# Patient Record
Sex: FEMALE | Race: WHITE | NOT HISPANIC OR LATINO | Employment: UNEMPLOYED | URBAN - NONMETROPOLITAN AREA
[De-identification: names, ages, dates, MRNs, and addresses within clinical notes are randomized per-mention and may not be internally consistent; named-entity substitution may affect disease eponyms.]

---

## 2017-01-01 ENCOUNTER — APPOINTMENT (OUTPATIENT)
Dept: GENERAL RADIOLOGY | Facility: HOSPITAL | Age: 42
End: 2017-01-01

## 2017-01-01 ENCOUNTER — APPOINTMENT (OUTPATIENT)
Dept: CT IMAGING | Facility: HOSPITAL | Age: 42
End: 2017-01-01

## 2017-01-01 ENCOUNTER — HOSPITAL ENCOUNTER (EMERGENCY)
Facility: HOSPITAL | Age: 42
Discharge: HOME | End: 2017-06-12
Attending: EMERGENCY MEDICINE | Admitting: EMERGENCY MEDICINE

## 2017-01-01 ENCOUNTER — APPOINTMENT (OUTPATIENT)
Dept: INTERVENTIONAL RADIOLOGY/VASCULAR | Facility: HOSPITAL | Age: 42
End: 2017-01-01

## 2017-01-01 ENCOUNTER — HOSPITAL ENCOUNTER (EMERGENCY)
Facility: HOSPITAL | Age: 42
Discharge: HOME | End: 2017-06-20
Attending: FAMILY MEDICINE | Admitting: FAMILY MEDICINE

## 2017-01-01 ENCOUNTER — APPOINTMENT (OUTPATIENT)
Dept: ULTRASOUND IMAGING | Facility: HOSPITAL | Age: 42
End: 2017-01-01

## 2017-01-01 ENCOUNTER — HOSPITAL ENCOUNTER (EMERGENCY)
Facility: HOSPITAL | Age: 42
Discharge: LEFT BEFORE DISCHARGE | End: 2017-03-26

## 2017-01-01 ENCOUNTER — ANESTHESIA EVENT (OUTPATIENT)
Dept: EMERGENCY DEPT | Facility: HOSPITAL | Age: 42
End: 2017-01-01

## 2017-01-01 ENCOUNTER — HOSPITAL ENCOUNTER (INPATIENT)
Facility: HOSPITAL | Age: 42
LOS: 4 days | End: 2017-07-10
Attending: EMERGENCY MEDICINE | Admitting: HOSPITALIST

## 2017-01-01 ENCOUNTER — ANESTHESIA (OUTPATIENT)
Dept: EMERGENCY DEPT | Facility: HOSPITAL | Age: 42
End: 2017-01-01

## 2017-01-01 ENCOUNTER — APPOINTMENT (OUTPATIENT)
Dept: CARDIOLOGY | Facility: HOSPITAL | Age: 42
End: 2017-01-01
Attending: INTERNAL MEDICINE

## 2017-01-01 ENCOUNTER — HOSPITAL ENCOUNTER (EMERGENCY)
Facility: HOSPITAL | Age: 42
Discharge: HOME | End: 2017-02-22
Attending: EMERGENCY MEDICINE | Admitting: EMERGENCY MEDICINE

## 2017-01-01 VITALS
TEMPERATURE: 98.1 F | HEART RATE: 85 BPM | HEIGHT: 62 IN | RESPIRATION RATE: 18 BRPM | SYSTOLIC BLOOD PRESSURE: 129 MMHG | WEIGHT: 293 LBS | OXYGEN SATURATION: 94 % | BODY MASS INDEX: 53.92 KG/M2 | DIASTOLIC BLOOD PRESSURE: 84 MMHG

## 2017-01-01 VITALS
BODY MASS INDEX: 53.92 KG/M2 | RESPIRATION RATE: 18 BRPM | HEIGHT: 62 IN | DIASTOLIC BLOOD PRESSURE: 67 MMHG | HEART RATE: 71 BPM | WEIGHT: 293 LBS | SYSTOLIC BLOOD PRESSURE: 125 MMHG | OXYGEN SATURATION: 96 % | TEMPERATURE: 98.2 F

## 2017-01-01 VITALS
WEIGHT: 293 LBS | OXYGEN SATURATION: 91 % | RESPIRATION RATE: 26 BRPM | TEMPERATURE: 100.5 F | DIASTOLIC BLOOD PRESSURE: 44 MMHG | SYSTOLIC BLOOD PRESSURE: 96 MMHG | HEIGHT: 62 IN | BODY MASS INDEX: 53.92 KG/M2

## 2017-01-01 VITALS
SYSTOLIC BLOOD PRESSURE: 146 MMHG | HEIGHT: 62 IN | DIASTOLIC BLOOD PRESSURE: 73 MMHG | OXYGEN SATURATION: 94 % | RESPIRATION RATE: 18 BRPM | BODY MASS INDEX: 53.92 KG/M2 | WEIGHT: 293 LBS | HEART RATE: 77 BPM

## 2017-01-01 VITALS
OXYGEN SATURATION: 94 % | HEART RATE: 64 BPM | TEMPERATURE: 97.9 F | HEIGHT: 62 IN | DIASTOLIC BLOOD PRESSURE: 78 MMHG | BODY MASS INDEX: 53.92 KG/M2 | WEIGHT: 293 LBS | RESPIRATION RATE: 18 BRPM | SYSTOLIC BLOOD PRESSURE: 153 MMHG

## 2017-01-01 DIAGNOSIS — N17.9 ACUTE RENAL FAILURE, UNSPECIFIED ACUTE RENAL FAILURE TYPE (HCC): ICD-10-CM

## 2017-01-01 DIAGNOSIS — S46.911A STRAIN OF RIGHT SHOULDER, INITIAL ENCOUNTER: ICD-10-CM

## 2017-01-01 DIAGNOSIS — R79.89 ELEVATED LFTS: ICD-10-CM

## 2017-01-01 DIAGNOSIS — J18.9 SEPSIS DUE TO PNEUMONIA (HCC): ICD-10-CM

## 2017-01-01 DIAGNOSIS — K43.9 VENTRAL HERNIA WITHOUT OBSTRUCTION OR GANGRENE: ICD-10-CM
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
## 2017-01-01 DIAGNOSIS — R16.0 HEPATOMEGALY: ICD-10-CM

## 2017-01-01 DIAGNOSIS — A41.9 SEPSIS DUE TO PNEUMONIA (HCC): ICD-10-CM

## 2017-01-01 DIAGNOSIS — E11.8 UNCONTROLLED TYPE 2 DIABETES MELLITUS WITH COMPLICATION, UNSPECIFIED LONG TERM INSULIN USE STATUS: ICD-10-CM

## 2017-01-01 DIAGNOSIS — N39.0 ACUTE UTI: ICD-10-CM

## 2017-01-01 DIAGNOSIS — E11.65 TYPE 2 DIABETES MELLITUS WITH HYPERGLYCEMIA, UNSPECIFIED LONG TERM INSULIN USE STATUS: ICD-10-CM

## 2017-01-01 DIAGNOSIS — J96.02 ACUTE RESPIRATORY FAILURE WITH HYPOXIA AND HYPERCAPNIA (HCC): Primary | ICD-10-CM

## 2017-01-01 DIAGNOSIS — R73.9 HYPERGLYCEMIA: ICD-10-CM

## 2017-01-01 DIAGNOSIS — R56.9 SEIZURE (HCC): Primary | ICD-10-CM

## 2017-01-01 DIAGNOSIS — S50.01XA CONTUSION OF RIGHT ELBOW, INITIAL ENCOUNTER: ICD-10-CM

## 2017-01-01 DIAGNOSIS — J96.01 ACUTE RESPIRATORY FAILURE WITH HYPOXIA AND HYPERCAPNIA (HCC): Primary | ICD-10-CM

## 2017-01-01 DIAGNOSIS — E11.65 UNCONTROLLED TYPE 2 DIABETES MELLITUS WITH COMPLICATION, UNSPECIFIED LONG TERM INSULIN USE STATUS: ICD-10-CM

## 2017-01-01 DIAGNOSIS — S16.1XXA STRAIN, CERVICAL, INITIAL ENCOUNTER: ICD-10-CM

## 2017-01-01 DIAGNOSIS — S29.012A STRAIN OF THORACIC PARASPINAL MUSCLES EXCLUDING T1 AND T2 LEVELS, INITIAL ENCOUNTER: ICD-10-CM

## 2017-01-01 DIAGNOSIS — R73.9 HYPERGLYCEMIA: Primary | ICD-10-CM

## 2017-01-01 DIAGNOSIS — N30.90 CYSTITIS: ICD-10-CM

## 2017-01-01 LAB
ACETONE BLD QL: NEGATIVE
ACETONE BLD QL: NEGATIVE
ALBUMIN SERPL-MCNC: 3.1 G/DL (ref 3.4–4.8)
ALBUMIN SERPL-MCNC: 3.4 G/DL (ref 3.4–4.8)
ALBUMIN SERPL-MCNC: 3.9 G/DL (ref 3.4–4.8)
ALBUMIN SERPL-MCNC: 4.2 G/DL (ref 3.4–4.8)
ALBUMIN/GLOB SERPL: 0.7 G/DL (ref 1.1–1.8)
ALBUMIN/GLOB SERPL: 0.7 G/DL (ref 1.1–1.8)
ALBUMIN/GLOB SERPL: 0.8 G/DL (ref 1.1–1.8)
ALBUMIN/GLOB SERPL: 0.8 G/DL (ref 1.1–1.8)
ALBUMIN/GLOB SERPL: 1 G/DL (ref 1.1–1.8)
ALBUMIN/GLOB SERPL: 1 G/DL (ref 1.1–1.8)
ALP SERPL-CCNC: 169 U/L (ref 38–126)
ALP SERPL-CCNC: 189 U/L (ref 38–126)
ALP SERPL-CCNC: 229 U/L (ref 38–126)
ALP SERPL-CCNC: 265 U/L (ref 38–126)
ALP SERPL-CCNC: 307 U/L (ref 38–126)
ALP SERPL-CCNC: 470 U/L (ref 38–126)
ALT SERPL W P-5'-P-CCNC: 110 U/L (ref 9–52)
ALT SERPL W P-5'-P-CCNC: 123 U/L (ref 9–52)
ALT SERPL W P-5'-P-CCNC: 53 U/L (ref 9–52)
ALT SERPL W P-5'-P-CCNC: 58 U/L (ref 9–52)
ALT SERPL W P-5'-P-CCNC: 90 U/L (ref 9–52)
ALT SERPL W P-5'-P-CCNC: 97 U/L (ref 9–52)
ANION GAP SERPL CALCULATED.3IONS-SCNC: 14 MMOL/L (ref 5–15)
ANION GAP SERPL CALCULATED.3IONS-SCNC: 15 MMOL/L (ref 5–15)
ANION GAP SERPL CALCULATED.3IONS-SCNC: 16 MMOL/L (ref 5–15)
ANION GAP SERPL CALCULATED.3IONS-SCNC: 17 MMOL/L (ref 5–15)
ANION GAP SERPL CALCULATED.3IONS-SCNC: 17 MMOL/L (ref 5–15)
ANION GAP SERPL CALCULATED.3IONS-SCNC: 18 MMOL/L (ref 5–15)
ANION GAP SERPL CALCULATED.3IONS-SCNC: 19 MMOL/L (ref 5–15)
ANION GAP SERPL CALCULATED.3IONS-SCNC: 23 MMOL/L (ref 5–15)
ANISOCYTOSIS BLD QL: ABNORMAL
ARTERIAL PATENCY WRIST A: ABNORMAL
AST SERPL-CCNC: 136 U/L (ref 14–36)
AST SERPL-CCNC: 213 U/L (ref 14–36)
AST SERPL-CCNC: 268 U/L (ref 14–36)
AST SERPL-CCNC: 387 U/L (ref 14–36)
AST SERPL-CCNC: 60 U/L (ref 14–36)
AST SERPL-CCNC: 76 U/L (ref 14–36)
ATMOSPHERIC PRESS: ABNORMAL MMHG
BACTERIA SPEC AEROBE CULT: NORMAL
BACTERIA SPEC RESP CULT: NORMAL
BACTERIA SPEC RESP CULT: NORMAL
BACTERIA UR QL AUTO: ABNORMAL /HPF
BASE EXCESS BLDA CALC-SCNC: -10.4 MMOL/L (ref -2.4–2.4)
BASE EXCESS BLDA CALC-SCNC: -11.3 MMOL/L (ref -2.4–2.4)
BASE EXCESS BLDA CALC-SCNC: -12.1 MMOL/L (ref -2.4–2.4)
BASE EXCESS BLDA CALC-SCNC: -12.6 MMOL/L (ref -2.4–2.4)
BASE EXCESS BLDA CALC-SCNC: -3.7 MMOL/L (ref -2.4–2.4)
BASE EXCESS BLDA CALC-SCNC: -5.3 MMOL/L (ref -2.4–2.4)
BASE EXCESS BLDA CALC-SCNC: -5.9 MMOL/L (ref -2.4–2.4)
BASE EXCESS BLDA CALC-SCNC: -6.1 MMOL/L (ref -2.4–2.4)
BASE EXCESS BLDA CALC-SCNC: -8.5 MMOL/L (ref -2.4–2.4)
BASE EXCESS BLDA CALC-SCNC: 0.3 MMOL/L (ref -2.4–2.4)
BASE EXCESS BLDA CALC-SCNC: 4.1 MMOL/L (ref -2.4–2.4)
BASE EXCESS BLDA CALC-SCNC: 4.7 MMOL/L (ref -2.4–2.4)
BASOPHILS # BLD AUTO: 0.02 10*3/MM3 (ref 0–0.2)
BASOPHILS # BLD AUTO: 0.02 10*3/MM3 (ref 0–0.2)
BASOPHILS # BLD AUTO: 0.03 10*3/MM3 (ref 0–0.2)
BASOPHILS # BLD AUTO: 0.04 10*3/MM3 (ref 0–0.2)
BASOPHILS # BLD AUTO: 0.06 10*3/MM3 (ref 0–0.2)
BASOPHILS NFR BLD AUTO: 0.2 % (ref 0–2)
BASOPHILS NFR BLD AUTO: 0.2 % (ref 0–2)
BASOPHILS NFR BLD AUTO: 0.5 % (ref 0–2)
BASOPHILS NFR BLD AUTO: 0.6 % (ref 0–2)
BASOPHILS NFR BLD AUTO: 0.8 % (ref 0–2)
BDY SITE: ABNORMAL
BH CV ECHO MEAS - AO MAX PG (FULL): 0.98 MMHG
BH CV ECHO MEAS - AO MAX PG: 5.4 MMHG
BH CV ECHO MEAS - AO MEAN PG (FULL): 0.31 MMHG
BH CV ECHO MEAS - AO MEAN PG: 3.5 MMHG
BH CV ECHO MEAS - AO ROOT AREA: 6.6 CM^2
BH CV ECHO MEAS - AO ROOT DIAM: 2.9 CM
BH CV ECHO MEAS - AO V2 MAX: 115.8 CM/SEC
BH CV ECHO MEAS - AO V2 MEAN: 89.6 CM/SEC
BH CV ECHO MEAS - AO V2 VTI: 19.9 CM
BH CV ECHO MEAS - AVA(I,A): 2.2 CM^2
BH CV ECHO MEAS - AVA(I,D): 2.2 CM^2
BH CV ECHO MEAS - AVA(V,A): 2.3 CM^2
BH CV ECHO MEAS - AVA(V,D): 2.3 CM^2
BH CV ECHO MEAS - EDV(CUBED): 105 ML
BH CV ECHO MEAS - EDV(TEICH): 103.2 ML
BH CV ECHO MEAS - EF(CUBED): 63.1 %
BH CV ECHO MEAS - EF(TEICH): 54.6 %
BH CV ECHO MEAS - ESV(CUBED): 38.7 ML
BH CV ECHO MEAS - ESV(TEICH): 46.8 ML
BH CV ECHO MEAS - FS: 28.3 %
BH CV ECHO MEAS - IVS/LVPW: 0.99
BH CV ECHO MEAS - IVSD: 0.98 CM
BH CV ECHO MEAS - LA DIMENSION: 3 CM
BH CV ECHO MEAS - LA/AO: 1
BH CV ECHO MEAS - LV MASS(C)D: 163.1 GRAMS
BH CV ECHO MEAS - LV MAX PG: 4.4 MMHG
BH CV ECHO MEAS - LV MEAN PG: 3.2 MMHG
BH CV ECHO MEAS - LV V1 MAX: 104.8 CM/SEC
BH CV ECHO MEAS - LV V1 MEAN: 86.5 CM/SEC
BH CV ECHO MEAS - LV V1 VTI: 17.4 CM
BH CV ECHO MEAS - LVIDD: 4.7 CM
BH CV ECHO MEAS - LVIDS: 3.4 CM
BH CV ECHO MEAS - LVOT AREA (M): 2.5 CM^2
BH CV ECHO MEAS - LVOT AREA: 2.6 CM^2
BH CV ECHO MEAS - LVOT DIAM: 1.8 CM
BH CV ECHO MEAS - LVPWD: 1 CM
BH CV ECHO MEAS - MV A MAX VEL: 77.1 CM/SEC
BH CV ECHO MEAS - MV DEC SLOPE: 462.6 CM/SEC^2
BH CV ECHO MEAS - MV E MAX VEL: 63.4 CM/SEC
BH CV ECHO MEAS - MV E/A: 0.82
BH CV ECHO MEAS - MV MAX PG: 4.3 MMHG
BH CV ECHO MEAS - MV MEAN PG: 1 MMHG
BH CV ECHO MEAS - MV P1/2T MAX VEL: 64.6 CM/SEC
BH CV ECHO MEAS - MV P1/2T: 40.9 MSEC
BH CV ECHO MEAS - MV V2 MAX: 103.2 CM/SEC
BH CV ECHO MEAS - MV V2 MEAN: 42 CM/SEC
BH CV ECHO MEAS - MV V2 VTI: 12.4 CM
BH CV ECHO MEAS - MVA P1/2T LCG: 3.4 CM^2
BH CV ECHO MEAS - MVA(P1/2T): 5.4 CM^2
BH CV ECHO MEAS - MVA(VTI): 3.6 CM^2
BH CV ECHO MEAS - PA MAX PG: 2.8 MMHG
BH CV ECHO MEAS - PA V2 MAX: 84.2 CM/SEC
BH CV ECHO MEAS - RVDD: 2.5 CM
BH CV ECHO MEAS - SV(AO): 131.6 ML
BH CV ECHO MEAS - SV(CUBED): 66.3 ML
BH CV ECHO MEAS - SV(LVOT): 44.3 ML
BH CV ECHO MEAS - SV(TEICH): 56.4 ML
BILIRUB SERPL-MCNC: 0.6 MG/DL (ref 0.2–1.3)
BILIRUB SERPL-MCNC: 0.6 MG/DL (ref 0.2–1.3)
BILIRUB SERPL-MCNC: 2.4 MG/DL (ref 0.2–1.3)
BILIRUB SERPL-MCNC: 3.6 MG/DL (ref 0.2–1.3)
BILIRUB SERPL-MCNC: 5.2 MG/DL (ref 0.2–1.3)
BILIRUB SERPL-MCNC: 6.1 MG/DL (ref 0.2–1.3)
BILIRUB UR QL STRIP: ABNORMAL
BILIRUB UR QL STRIP: NEGATIVE
BILIRUB UR QL STRIP: NEGATIVE
BUN BLD-MCNC: 13 MG/DL (ref 7–21)
BUN BLD-MCNC: 14 MG/DL (ref 7–21)
BUN BLD-MCNC: 30 MG/DL (ref 7–21)
BUN BLD-MCNC: 33 MG/DL (ref 7–21)
BUN BLD-MCNC: 37 MG/DL (ref 7–21)
BUN BLD-MCNC: 41 MG/DL (ref 7–21)
BUN BLD-MCNC: 55 MG/DL (ref 7–21)
BUN BLD-MCNC: 56 MG/DL (ref 7–21)
BUN/CREAT SERPL: 10.1 (ref 7–25)
BUN/CREAT SERPL: 12.2 (ref 7–25)
BUN/CREAT SERPL: 16.9 (ref 7–25)
BUN/CREAT SERPL: 17.3 (ref 7–25)
BUN/CREAT SERPL: 17.6 (ref 7–25)
BUN/CREAT SERPL: 17.9 (ref 7–25)
BUN/CREAT SERPL: 7.9 (ref 7–25)
BUN/CREAT SERPL: 8.3 (ref 7–25)
CA-I BLD-MCNC: 3.8 MG/DL (ref 4.5–4.9)
CA-I BLD-MCNC: 3.8 MG/DL (ref 4.5–4.9)
CA-I BLD-MCNC: 4 MG/DL (ref 4.5–4.9)
CA-I BLD-MCNC: 4.2 MG/DL (ref 4.5–4.9)
CA-I BLD-MCNC: 4.4 MG/DL (ref 4.5–4.9)
CA-I BLD-MCNC: 4.5 MG/DL (ref 4.5–4.9)
CA-I BLD-MCNC: 4.6 MG/DL (ref 4.5–4.9)
CA-I BLD-MCNC: 4.7 MG/DL (ref 4.5–4.9)
CALCIUM SPEC-SCNC: 10.2 MG/DL (ref 8.4–10.2)
CALCIUM SPEC-SCNC: 6.8 MG/DL (ref 8.4–10.2)
CALCIUM SPEC-SCNC: 7.5 MG/DL (ref 8.4–10.2)
CALCIUM SPEC-SCNC: 7.6 MG/DL (ref 8.4–10.2)
CALCIUM SPEC-SCNC: 8.1 MG/DL (ref 8.4–10.2)
CALCIUM SPEC-SCNC: 8.2 MG/DL (ref 8.4–10.2)
CALCIUM SPEC-SCNC: 9 MG/DL (ref 8.4–10.2)
CALCIUM SPEC-SCNC: 9.9 MG/DL (ref 8.4–10.2)
CHLORIDE SERPL-SCNC: 86 MMOL/L (ref 95–110)
CHLORIDE SERPL-SCNC: 87 MMOL/L (ref 95–110)
CHLORIDE SERPL-SCNC: 93 MMOL/L (ref 95–110)
CHLORIDE SERPL-SCNC: 94 MMOL/L (ref 95–110)
CHLORIDE SERPL-SCNC: 94 MMOL/L (ref 95–110)
CHLORIDE SERPL-SCNC: 96 MMOL/L (ref 95–110)
CHLORIDE SERPL-SCNC: 98 MMOL/L (ref 95–110)
CHLORIDE SERPL-SCNC: 98 MMOL/L (ref 95–110)
CLARITY UR: ABNORMAL
CO2 BLDA-SCNC: 21.8 MMOL/L (ref 23–27)
CO2 BLDA-SCNC: 23.4 MMOL/L (ref 23–27)
CO2 BLDA-SCNC: 23.8 MMOL/L (ref 23–27)
CO2 BLDA-SCNC: 25 MMOL/L (ref 23–27)
CO2 BLDA-SCNC: 25 MMOL/L (ref 23–27)
CO2 BLDA-SCNC: 25.9 MMOL/L (ref 23–27)
CO2 BLDA-SCNC: 26.6 MMOL/L (ref 23–27)
CO2 BLDA-SCNC: 26.9 MMOL/L (ref 23–27)
CO2 BLDA-SCNC: 28.4 MMOL/L (ref 23–27)
CO2 BLDA-SCNC: 30.9 MMOL/L (ref 23–27)
CO2 BLDA-SCNC: 31.8 MMOL/L (ref 23–27)
CO2 BLDA-SCNC: 40.6 MMOL/L (ref 23–27)
CO2 SERPL-SCNC: 19 MMOL/L (ref 22–31)
CO2 SERPL-SCNC: 20 MMOL/L (ref 22–31)
CO2 SERPL-SCNC: 20 MMOL/L (ref 22–31)
CO2 SERPL-SCNC: 21 MMOL/L (ref 22–31)
CO2 SERPL-SCNC: 24 MMOL/L (ref 22–31)
CO2 SERPL-SCNC: 25 MMOL/L (ref 22–31)
CO2 SERPL-SCNC: 25 MMOL/L (ref 22–31)
CO2 SERPL-SCNC: 33 MMOL/L (ref 22–31)
COLOR UR: ABNORMAL
COLOR UR: ABNORMAL
COLOR UR: YELLOW
CREAT BLD-MCNC: 0.74 MG/DL (ref 0.5–1)
CREAT BLD-MCNC: 0.81 MG/DL (ref 0.5–1)
CREAT BLD-MCNC: 1.78 MG/DL (ref 0.5–1)
CREAT BLD-MCNC: 1.84 MG/DL (ref 0.5–1)
CREAT BLD-MCNC: 3.36 MG/DL (ref 0.5–1)
CREAT BLD-MCNC: 4.68 MG/DL (ref 0.5–1)
CREAT BLD-MCNC: 5.46 MG/DL (ref 0.5–1)
CREAT BLD-MCNC: 6.74 MG/DL (ref 0.5–1)
CREAT UR-MCNC: 103.3 MG/DL
D-LACTATE SERPL-SCNC: 2.4 MMOL/L (ref 0.5–2)
D-LACTATE SERPL-SCNC: 3 MMOL/L (ref 0.5–2)
DEPRECATED RDW RBC AUTO: 45.6 FL (ref 36.4–46.3)
DEPRECATED RDW RBC AUTO: 46.6 FL (ref 36.4–46.3)
DEPRECATED RDW RBC AUTO: 47 FL (ref 36.4–46.3)
DEPRECATED RDW RBC AUTO: 48.6 FL (ref 36.4–46.3)
DEPRECATED RDW RBC AUTO: 49.1 FL (ref 36.4–46.3)
DEPRECATED RDW RBC AUTO: 49.3 FL (ref 36.4–46.3)
DEPRECATED RDW RBC AUTO: 56.3 FL (ref 36.4–46.3)
EOSINOPHIL # BLD AUTO: 0 10*3/MM3 (ref 0–0.7)
EOSINOPHIL # BLD AUTO: 0 10*3/MM3 (ref 0–0.7)
EOSINOPHIL # BLD AUTO: 0.01 10*3/MM3 (ref 0–0.7)
EOSINOPHIL # BLD AUTO: 0.21 10*3/MM3 (ref 0–0.7)
EOSINOPHIL # BLD AUTO: 0.26 10*3/MM3 (ref 0–0.7)
EOSINOPHIL NFR BLD AUTO: 0 % (ref 0–7)
EOSINOPHIL NFR BLD AUTO: 0 % (ref 0–7)
EOSINOPHIL NFR BLD AUTO: 0.2 % (ref 0–7)
EOSINOPHIL NFR BLD AUTO: 3.1 % (ref 0–7)
EOSINOPHIL NFR BLD AUTO: 3.6 % (ref 0–7)
ERYTHROCYTE [DISTWIDTH] IN BLOOD BY AUTOMATED COUNT: 14.2 % (ref 11.5–14.5)
ERYTHROCYTE [DISTWIDTH] IN BLOOD BY AUTOMATED COUNT: 14.2 % (ref 11.5–14.5)
ERYTHROCYTE [DISTWIDTH] IN BLOOD BY AUTOMATED COUNT: 14.4 % (ref 11.5–14.5)
ERYTHROCYTE [DISTWIDTH] IN BLOOD BY AUTOMATED COUNT: 14.7 % (ref 11.5–14.5)
ERYTHROCYTE [DISTWIDTH] IN BLOOD BY AUTOMATED COUNT: 14.9 % (ref 11.5–14.5)
ERYTHROCYTE [DISTWIDTH] IN BLOOD BY AUTOMATED COUNT: 15 % (ref 11.5–14.5)
ERYTHROCYTE [DISTWIDTH] IN BLOOD BY AUTOMATED COUNT: 16.8 % (ref 11.5–14.5)
GFR SERPL CREATININE-BSD FRML MDRD: 10 ML/MIN/1.73 (ref 60–135)
GFR SERPL CREATININE-BSD FRML MDRD: 15 ML/MIN/1.73
GFR SERPL CREATININE-BSD FRML MDRD: 30 ML/MIN/1.73 (ref 58–135)
GFR SERPL CREATININE-BSD FRML MDRD: 31 ML/MIN/1.73 (ref 58–135)
GFR SERPL CREATININE-BSD FRML MDRD: 7 ML/MIN/1.73 (ref 58–135)
GFR SERPL CREATININE-BSD FRML MDRD: 78 ML/MIN/1.73 (ref 58–135)
GFR SERPL CREATININE-BSD FRML MDRD: 87 ML/MIN/1.73 (ref 58–135)
GFR SERPL CREATININE-BSD FRML MDRD: 9 ML/MIN/1.73 (ref 58–135)
GLOBULIN UR ELPH-MCNC: 4.1 GM/DL (ref 2.3–3.5)
GLOBULIN UR ELPH-MCNC: 4.3 GM/DL (ref 2.3–3.5)
GLOBULIN UR ELPH-MCNC: 4.4 GM/DL (ref 2.3–3.5)
GLOBULIN UR ELPH-MCNC: 4.8 GM/DL (ref 2.3–3.5)
GLUCOSE BLD-MCNC: 124 MG/DL (ref 60–100)
GLUCOSE BLD-MCNC: 143 MG/DL (ref 60–100)
GLUCOSE BLD-MCNC: 185 MG/DL (ref 60–100)
GLUCOSE BLD-MCNC: 381 MG/DL (ref 60–100)
GLUCOSE BLD-MCNC: 454 MG/DL (ref 60–100)
GLUCOSE BLD-MCNC: 525 MG/DL (ref 60–100)
GLUCOSE BLD-MCNC: 546 MG/DL (ref 60–100)
GLUCOSE BLD-MCNC: 588 MG/DL (ref 60–100)
GLUCOSE BLD-MCNC: 623 MG/DL (ref 60–100)
GLUCOSE BLDA-MCNC: 107 MMOL/L
GLUCOSE BLDA-MCNC: 124 MMOL/L
GLUCOSE BLDA-MCNC: 132 MMOL/L
GLUCOSE BLDA-MCNC: 134 MMOL/L
GLUCOSE BLDA-MCNC: 145 MMOL/L
GLUCOSE BLDA-MCNC: 200 MMOL/L
GLUCOSE BLDA-MCNC: 410 MMOL/L
GLUCOSE BLDA-MCNC: 483 MMOL/L
GLUCOSE BLDA-MCNC: 567 MMOL/L
GLUCOSE BLDA-MCNC: 582 MMOL/L
GLUCOSE BLDA-MCNC: 583 MMOL/L
GLUCOSE BLDA-MCNC: 95 MMOL/L
GLUCOSE BLDC GLUCOMTR-MCNC: 104 MG/DL (ref 70–130)
GLUCOSE BLDC GLUCOMTR-MCNC: 104 MG/DL (ref 70–130)
GLUCOSE BLDC GLUCOMTR-MCNC: 106 MG/DL (ref 70–130)
GLUCOSE BLDC GLUCOMTR-MCNC: 106 MG/DL (ref 70–130)
GLUCOSE BLDC GLUCOMTR-MCNC: 107 MG/DL (ref 70–130)
GLUCOSE BLDC GLUCOMTR-MCNC: 110 MG/DL (ref 70–130)
GLUCOSE BLDC GLUCOMTR-MCNC: 111 MG/DL (ref 70–130)
GLUCOSE BLDC GLUCOMTR-MCNC: 112 MG/DL (ref 70–130)
GLUCOSE BLDC GLUCOMTR-MCNC: 113 MG/DL (ref 70–130)
GLUCOSE BLDC GLUCOMTR-MCNC: 114 MG/DL (ref 70–130)
GLUCOSE BLDC GLUCOMTR-MCNC: 115 MG/DL (ref 70–130)
GLUCOSE BLDC GLUCOMTR-MCNC: 117 MG/DL (ref 70–130)
GLUCOSE BLDC GLUCOMTR-MCNC: 118 MG/DL (ref 70–130)
GLUCOSE BLDC GLUCOMTR-MCNC: 119 MG/DL (ref 70–130)
GLUCOSE BLDC GLUCOMTR-MCNC: 119 MG/DL (ref 70–130)
GLUCOSE BLDC GLUCOMTR-MCNC: 120 MG/DL (ref 70–130)
GLUCOSE BLDC GLUCOMTR-MCNC: 120 MG/DL (ref 70–130)
GLUCOSE BLDC GLUCOMTR-MCNC: 121 MG/DL (ref 70–130)
GLUCOSE BLDC GLUCOMTR-MCNC: 121 MG/DL (ref 70–130)
GLUCOSE BLDC GLUCOMTR-MCNC: 125 MG/DL (ref 70–130)
GLUCOSE BLDC GLUCOMTR-MCNC: 125 MG/DL (ref 70–130)
GLUCOSE BLDC GLUCOMTR-MCNC: 127 MG/DL (ref 70–130)
GLUCOSE BLDC GLUCOMTR-MCNC: 128 MG/DL (ref 70–130)
GLUCOSE BLDC GLUCOMTR-MCNC: 128 MG/DL (ref 70–130)
GLUCOSE BLDC GLUCOMTR-MCNC: 129 MG/DL (ref 70–130)
GLUCOSE BLDC GLUCOMTR-MCNC: 130 MG/DL (ref 70–130)
GLUCOSE BLDC GLUCOMTR-MCNC: 132 MG/DL (ref 70–130)
GLUCOSE BLDC GLUCOMTR-MCNC: 134 MG/DL (ref 70–130)
GLUCOSE BLDC GLUCOMTR-MCNC: 144 MG/DL (ref 70–130)
GLUCOSE BLDC GLUCOMTR-MCNC: 144 MG/DL (ref 70–130)
GLUCOSE BLDC GLUCOMTR-MCNC: 145 MG/DL (ref 70–130)
GLUCOSE BLDC GLUCOMTR-MCNC: 146 MG/DL (ref 70–130)
GLUCOSE BLDC GLUCOMTR-MCNC: 147 MG/DL (ref 70–130)
GLUCOSE BLDC GLUCOMTR-MCNC: 176 MG/DL (ref 70–130)
GLUCOSE BLDC GLUCOMTR-MCNC: 185 MG/DL (ref 70–130)
GLUCOSE BLDC GLUCOMTR-MCNC: 189 MG/DL (ref 70–130)
GLUCOSE BLDC GLUCOMTR-MCNC: 196 MG/DL (ref 70–130)
GLUCOSE BLDC GLUCOMTR-MCNC: 220 MG/DL (ref 70–130)
GLUCOSE BLDC GLUCOMTR-MCNC: 227 MG/DL (ref 70–130)
GLUCOSE BLDC GLUCOMTR-MCNC: 232 MG/DL (ref 70–130)
GLUCOSE BLDC GLUCOMTR-MCNC: 237 MG/DL (ref 70–130)
GLUCOSE BLDC GLUCOMTR-MCNC: 259 MG/DL (ref 70–130)
GLUCOSE BLDC GLUCOMTR-MCNC: 283 MG/DL (ref 70–130)
GLUCOSE BLDC GLUCOMTR-MCNC: 289 MG/DL (ref 70–130)
GLUCOSE BLDC GLUCOMTR-MCNC: 329 MG/DL (ref 70–130)
GLUCOSE BLDC GLUCOMTR-MCNC: 341 MG/DL (ref 70–130)
GLUCOSE BLDC GLUCOMTR-MCNC: 354 MG/DL (ref 70–130)
GLUCOSE BLDC GLUCOMTR-MCNC: 359 MG/DL (ref 70–130)
GLUCOSE BLDC GLUCOMTR-MCNC: 377 MG/DL (ref 70–130)
GLUCOSE BLDC GLUCOMTR-MCNC: 425 MG/DL (ref 70–130)
GLUCOSE BLDC GLUCOMTR-MCNC: 443 MG/DL (ref 70–130)
GLUCOSE BLDC GLUCOMTR-MCNC: 506 MG/DL (ref 70–130)
GLUCOSE BLDC GLUCOMTR-MCNC: 521 MG/DL (ref 70–130)
GLUCOSE BLDC GLUCOMTR-MCNC: 521 MG/DL (ref 70–130)
GLUCOSE BLDC GLUCOMTR-MCNC: 543 MG/DL (ref 70–130)
GLUCOSE BLDC GLUCOMTR-MCNC: 543 MG/DL (ref 70–130)
GLUCOSE BLDC GLUCOMTR-MCNC: 571 MG/DL (ref 70–130)
GLUCOSE BLDC GLUCOMTR-MCNC: 79 MG/DL (ref 70–130)
GLUCOSE BLDC GLUCOMTR-MCNC: 80 MG/DL (ref 70–130)
GLUCOSE BLDC GLUCOMTR-MCNC: 88 MG/DL (ref 70–130)
GLUCOSE BLDC GLUCOMTR-MCNC: 94 MG/DL (ref 70–130)
GLUCOSE BLDC GLUCOMTR-MCNC: 98 MG/DL (ref 70–130)
GLUCOSE UR STRIP-MCNC: ABNORMAL MG/DL
GRAM STN SPEC: NORMAL
HCG SERPL QL: NEGATIVE
HCO3 BLDA-SCNC: 19.6 MMOL/L (ref 22–26)
HCO3 BLDA-SCNC: 21.7 MMOL/L (ref 22–26)
HCO3 BLDA-SCNC: 21.8 MMOL/L (ref 22–26)
HCO3 BLDA-SCNC: 22.1 MMOL/L (ref 22–26)
HCO3 BLDA-SCNC: 23.1 MMOL/L (ref 22–26)
HCO3 BLDA-SCNC: 23.2 MMOL/L (ref 22–26)
HCO3 BLDA-SCNC: 24.2 MMOL/L (ref 22–26)
HCO3 BLDA-SCNC: 24.8 MMOL/L (ref 22–26)
HCO3 BLDA-SCNC: 25.3 MMOL/L (ref 22–26)
HCO3 BLDA-SCNC: 27.7 MMOL/L (ref 22–26)
HCO3 BLDA-SCNC: 30.2 MMOL/L (ref 22–26)
HCO3 BLDA-SCNC: 37.2 MMOL/L (ref 22–26)
HCT VFR BLD AUTO: 34.5 % (ref 35–45)
HCT VFR BLD AUTO: 35.9 % (ref 35–45)
HCT VFR BLD AUTO: 36.1 % (ref 35–45)
HCT VFR BLD AUTO: 38.6 % (ref 35–45)
HCT VFR BLD AUTO: 38.7 % (ref 35–45)
HCT VFR BLD AUTO: 39.7 % (ref 35–45)
HCT VFR BLD AUTO: 43.3 % (ref 35–45)
HCT VFR BLD CALC: 36 % (ref 38–47)
HCT VFR BLD CALC: 37 % (ref 38–47)
HCT VFR BLD CALC: 38 % (ref 38–47)
HCT VFR BLD CALC: 39 %
HCT VFR BLD CALC: 40 % (ref 38–47)
HCT VFR BLD CALC: 41 % (ref 38–47)
HCT VFR BLD CALC: 42 % (ref 38–47)
HCT VFR BLD CALC: 43 %
HCT VFR BLD CALC: 43 % (ref 38–47)
HCT VFR BLD CALC: 44 % (ref 38–47)
HGB BLD-MCNC: 11.4 G/DL (ref 12–15.5)
HGB BLD-MCNC: 12.1 G/DL (ref 12–15.5)
HGB BLD-MCNC: 12.5 G/DL (ref 12–15.5)
HGB BLD-MCNC: 13.4 G/DL (ref 12–15.5)
HGB BLD-MCNC: 13.4 G/DL (ref 12–15.5)
HGB BLD-MCNC: 14 G/DL (ref 12–15.5)
HGB BLD-MCNC: 15.4 G/DL (ref 12–15.5)
HGB BLDA-MCNC: 12.2 G/DL (ref 12–16)
HGB BLDA-MCNC: 12.7 G/DL (ref 12–16)
HGB BLDA-MCNC: 13 G/DL (ref 12–16)
HGB BLDA-MCNC: 13.2 G/DL (ref 12–16)
HGB BLDA-MCNC: 13.7 G/DL (ref 12–16)
HGB BLDA-MCNC: 13.9 G/DL (ref 12–16)
HGB BLDA-MCNC: 14.2 G/DL (ref 12–16)
HGB BLDA-MCNC: 14.3 G/DL (ref 12–16)
HGB BLDA-MCNC: 14.4 G/DL (ref 12–16)
HGB BLDA-MCNC: 14.5 G/DL (ref 12–16)
HGB BLDA-MCNC: 14.7 G/DL (ref 12–16)
HGB BLDA-MCNC: 14.9 G/DL (ref 12–16)
HGB UR QL STRIP.AUTO: ABNORMAL
HOLD SPECIMEN: NORMAL
HYALINE CASTS UR QL AUTO: ABNORMAL /LPF
IMM GRANULOCYTES # BLD: 0.03 10*3/MM3 (ref 0–0.02)
IMM GRANULOCYTES # BLD: 0.04 10*3/MM3 (ref 0–0.02)
IMM GRANULOCYTES # BLD: 0.06 10*3/MM3 (ref 0–0.02)
IMM GRANULOCYTES # BLD: 0.88 10*3/MM3 (ref 0–0.02)
IMM GRANULOCYTES # BLD: 1.28 10*3/MM3 (ref 0–0.02)
IMM GRANULOCYTES NFR BLD: 0.6 % (ref 0–0.5)
IMM GRANULOCYTES NFR BLD: 0.7 % (ref 0–0.5)
IMM GRANULOCYTES NFR BLD: 0.9 % (ref 0–0.5)
IMM GRANULOCYTES NFR BLD: 7.9 % (ref 0–0.5)
IMM GRANULOCYTES NFR BLD: 8.1 % (ref 0–0.5)
KETONES UR QL STRIP: ABNORMAL
KETONES UR QL STRIP: NEGATIVE
KETONES UR QL STRIP: NEGATIVE
L PNEUMO1 AG UR QL IA: POSITIVE
LEUKOCYTE ESTERASE UR QL STRIP.AUTO: ABNORMAL
LIPASE SERPL-CCNC: 128 U/L (ref 23–300)
LYMPHOCYTES # BLD AUTO: 0.43 10*3/MM3 (ref 0.6–4.2)
LYMPHOCYTES # BLD AUTO: 0.49 10*3/MM3 (ref 0.6–4.2)
LYMPHOCYTES # BLD AUTO: 0.51 10*3/MM3 (ref 0.6–4.2)
LYMPHOCYTES # BLD AUTO: 1.85 10*3/MM3 (ref 0.6–4.2)
LYMPHOCYTES # BLD AUTO: 2.08 10*3/MM3 (ref 0.6–4.2)
LYMPHOCYTES # BLD MANUAL: 1.14 10*3/MM3 (ref 0.6–4.2)
LYMPHOCYTES NFR BLD AUTO: 12 % (ref 10–50)
LYMPHOCYTES NFR BLD AUTO: 26.9 % (ref 10–50)
LYMPHOCYTES NFR BLD AUTO: 28.8 % (ref 10–50)
LYMPHOCYTES NFR BLD AUTO: 3 % (ref 10–50)
LYMPHOCYTES NFR BLD AUTO: 4 % (ref 10–50)
LYMPHOCYTES NFR BLD MANUAL: 1 % (ref 0–12)
LYMPHOCYTES NFR BLD MANUAL: 3 % (ref 10–50)
MCH RBC QN AUTO: 29.9 PG (ref 26.5–34)
MCH RBC QN AUTO: 30.1 PG (ref 26.5–34)
MCH RBC QN AUTO: 30.5 PG (ref 26.5–34)
MCH RBC QN AUTO: 30.9 PG (ref 26.5–34)
MCH RBC QN AUTO: 31 PG (ref 26.5–34)
MCH RBC QN AUTO: 31.8 PG (ref 26.5–34)
MCH RBC QN AUTO: 31.9 PG (ref 26.5–34)
MCHC RBC AUTO-ENTMCNC: 33 G/DL (ref 31.4–36)
MCHC RBC AUTO-ENTMCNC: 33.7 G/DL (ref 31.4–36)
MCHC RBC AUTO-ENTMCNC: 34.6 G/DL (ref 31.4–36)
MCHC RBC AUTO-ENTMCNC: 34.6 G/DL (ref 31.4–36)
MCHC RBC AUTO-ENTMCNC: 34.7 G/DL (ref 31.4–36)
MCHC RBC AUTO-ENTMCNC: 35.3 G/DL (ref 31.4–36)
MCHC RBC AUTO-ENTMCNC: 35.6 G/DL (ref 31.4–36)
MCV RBC AUTO: 87.8 FL (ref 80–98)
MCV RBC AUTO: 88 FL (ref 80–98)
MCV RBC AUTO: 88.6 FL (ref 80–98)
MCV RBC AUTO: 89.4 FL (ref 80–98)
MCV RBC AUTO: 89.6 FL (ref 80–98)
MCV RBC AUTO: 91 FL (ref 80–98)
MCV RBC AUTO: 91.5 FL (ref 80–98)
METAMYELOCYTES NFR BLD MANUAL: 2 % (ref 0–0)
MODALITY: ABNORMAL
MONOCYTES # BLD AUTO: 0.07 10*3/MM3 (ref 0–0.9)
MONOCYTES # BLD AUTO: 0.09 10*3/MM3 (ref 0–0.9)
MONOCYTES # BLD AUTO: 0.29 10*3/MM3 (ref 0–0.9)
MONOCYTES # BLD AUTO: 0.31 10*3/MM3 (ref 0–0.9)
MONOCYTES # BLD AUTO: 0.38 10*3/MM3 (ref 0–0.9)
MONOCYTES # BLD AUTO: 0.48 10*3/MM3 (ref 0–0.9)
MONOCYTES NFR BLD AUTO: 0.4 % (ref 0–12)
MONOCYTES NFR BLD AUTO: 2.1 % (ref 0–12)
MONOCYTES NFR BLD AUTO: 2.9 % (ref 0–12)
MONOCYTES NFR BLD AUTO: 4.2 % (ref 0–12)
MONOCYTES NFR BLD AUTO: 6.6 % (ref 0–12)
NEUTROPHILS # BLD AUTO: 14.41 10*3/MM3 (ref 2–8.6)
NEUTROPHILS # BLD AUTO: 3.59 10*3/MM3 (ref 2–8.6)
NEUTROPHILS # BLD AUTO: 35.46 10*3/MM3 (ref 2–8.6)
NEUTROPHILS # BLD AUTO: 4.3 10*3/MM3 (ref 2–8.6)
NEUTROPHILS # BLD AUTO: 4.42 10*3/MM3 (ref 2–8.6)
NEUTROPHILS # BLD AUTO: 9.17 10*3/MM3 (ref 2–8.6)
NEUTROPHILS NFR BLD AUTO: 59.6 % (ref 37–80)
NEUTROPHILS NFR BLD AUTO: 64.3 % (ref 37–80)
NEUTROPHILS NFR BLD AUTO: 84.5 % (ref 37–80)
NEUTROPHILS NFR BLD AUTO: 84.8 % (ref 37–80)
NEUTROPHILS NFR BLD AUTO: 88.5 % (ref 37–80)
NEUTROPHILS NFR BLD MANUAL: 72 % (ref 37–80)
NEUTS BAND NFR BLD MANUAL: 21 % (ref 0–5)
NITRITE UR QL STRIP: NEGATIVE
NRBC BLD MANUAL-RTO: 0 /100 WBC (ref 0–0)
NRBC BLD MANUAL-RTO: 0 /100 WBC (ref 0–0)
NT-PROBNP SERPL-MCNC: 1050 PG/ML (ref 0–450)
PCO2 BLDA: 105.2 MM HG (ref 35–45)
PCO2 BLDA: 110.4 MM HG (ref 35–45)
PCO2 BLDA: 119.1 MM HG (ref 35–45)
PCO2 BLDA: 120.8 MM HG (ref 35–45)
PCO2 BLDA: 42.4 MM HG (ref 35–45)
PCO2 BLDA: 52.3 MM HG (ref 35–45)
PCO2 BLDA: 52.4 MM HG (ref 35–45)
PCO2 BLDA: 56 MM HG (ref 35–45)
PCO2 BLDA: 61.5 MM HG (ref 35–45)
PCO2 BLDA: 67.7 MM HG (ref 35–45)
PCO2 BLDA: 72.3 MM HG (ref 35–45)
PCO2 BLDA: 93.1 MM HG (ref 35–45)
PH BLDA: 6.9 PH UNITS (ref 7.35–7.45)
PH BLDA: 6.94 PH UNITS (ref 7.35–7.45)
PH BLDA: 6.99 PH UNITS (ref 7.35–7.45)
PH BLDA: 7.04 PH UNITS (ref 7.35–7.45)
PH BLDA: 7.05 PH UNITS (ref 7.35–7.45)
PH BLDA: 7.12 PH UNITS (ref 7.35–7.45)
PH BLDA: 7.15 PH UNITS (ref 7.35–7.45)
PH BLDA: 7.19 PH UNITS (ref 7.35–7.45)
PH BLDA: 7.24 PH UNITS (ref 7.35–7.45)
PH BLDA: 7.25 PH UNITS (ref 7.35–7.45)
PH BLDA: 7.38 PH UNITS (ref 7.35–7.45)
PH BLDA: 7.39 PH UNITS (ref 7.35–7.45)
PH BLDV: 7.37 [PH] (ref 7.31–7.42)
PH UR STRIP.AUTO: 6 [PH] (ref 5–9)
PH UR STRIP.AUTO: 7 [PH] (ref 5–9)
PH UR STRIP.AUTO: <=5 [PH] (ref 5–9)
PLAT MORPH BLD: NORMAL
PLATELET # BLD AUTO: 147 10*3/MM3 (ref 150–450)
PLATELET # BLD AUTO: 158 10*3/MM3 (ref 150–450)
PLATELET # BLD AUTO: 164 10*3/MM3 (ref 150–450)
PLATELET # BLD AUTO: 203 10*3/MM3 (ref 150–450)
PLATELET # BLD AUTO: 208 10*3/MM3 (ref 150–450)
PLATELET # BLD AUTO: 237 10*3/MM3 (ref 150–450)
PLATELET # BLD AUTO: 248 10*3/MM3 (ref 150–450)
PMV BLD AUTO: 10.3 FL (ref 8–12)
PMV BLD AUTO: 10.3 FL (ref 8–12)
PMV BLD AUTO: 10.7 FL (ref 8–12)
PMV BLD AUTO: 10.7 FL (ref 8–12)
PMV BLD AUTO: 10.8 FL (ref 8–12)
PMV BLD AUTO: ABNORMAL FL (ref 8–12)
PMV BLD AUTO: ABNORMAL FL (ref 8–12)
PO2 BLDA: 101 MM HG (ref 80–105)
PO2 BLDA: 41.1 MM HG (ref 80–105)
PO2 BLDA: 45.2 MM HG (ref 80–105)
PO2 BLDA: 47.9 MM HG (ref 80–105)
PO2 BLDA: 49.4 MM HG (ref 80–105)
PO2 BLDA: 53.8 MM HG (ref 80–105)
PO2 BLDA: 54.3 MM HG (ref 80–105)
PO2 BLDA: 54.6 MM HG (ref 80–105)
PO2 BLDA: 58.5 MM HG (ref 80–105)
PO2 BLDA: 61.2 MM HG (ref 80–105)
PO2 BLDA: 65.5 MM HG (ref 80–105)
PO2 BLDA: 84.3 MM HG (ref 80–105)
POTASSIUM BLD-SCNC: 3.6 MMOL/L (ref 3.5–5.1)
POTASSIUM BLD-SCNC: 4.1 MMOL/L (ref 3.5–5.1)
POTASSIUM BLD-SCNC: 4.2 MMOL/L (ref 3.5–5.1)
POTASSIUM BLD-SCNC: 4.3 MMOL/L (ref 3.5–5.1)
POTASSIUM BLD-SCNC: 4.6 MMOL/L (ref 3.5–5.1)
POTASSIUM BLD-SCNC: 4.9 MMOL/L (ref 3.5–5.1)
POTASSIUM BLD-SCNC: 4.9 MMOL/L (ref 3.5–5.1)
POTASSIUM BLD-SCNC: 5.2 MMOL/L (ref 3.5–5.1)
POTASSIUM BLDA-SCNC: 3.64 MMOL/L (ref 3.6–4.9)
POTASSIUM BLDA-SCNC: 3.65 MMOL/L (ref 3.6–4.9)
POTASSIUM BLDA-SCNC: 3.83 MMOL/L (ref 3.6–4.9)
POTASSIUM BLDA-SCNC: 4.26 MMOL/L (ref 3.6–4.9)
POTASSIUM BLDA-SCNC: 4.27 MMOL/L (ref 3.6–4.9)
POTASSIUM BLDA-SCNC: 4.46 MMOL/L (ref 3.6–4.9)
POTASSIUM BLDA-SCNC: 4.73 MMOL/L (ref 3.6–4.9)
POTASSIUM BLDA-SCNC: 4.87 MMOL/L (ref 3.6–4.9)
POTASSIUM BLDA-SCNC: 5.03 MMOL/L (ref 3.6–4.9)
POTASSIUM BLDA-SCNC: 5.09 MMOL/L (ref 3.6–4.9)
POTASSIUM BLDA-SCNC: 5.46 MMOL/L (ref 3.6–4.9)
POTASSIUM BLDA-SCNC: 5.46 MMOL/L (ref 3.6–4.9)
PROT SERPL-MCNC: 7.5 G/DL (ref 6.3–8.6)
PROT SERPL-MCNC: 7.5 G/DL (ref 6.3–8.6)
PROT SERPL-MCNC: 7.7 G/DL (ref 6.3–8.6)
PROT SERPL-MCNC: 8 G/DL (ref 6.3–8.6)
PROT SERPL-MCNC: 8.2 G/DL (ref 6.3–8.6)
PROT SERPL-MCNC: 8.3 G/DL (ref 6.3–8.6)
PROT UR QL STRIP: ABNORMAL
RBC # BLD AUTO: 3.79 10*6/MM3 (ref 3.77–5.16)
RBC # BLD AUTO: 4.04 10*6/MM3 (ref 3.77–5.16)
RBC # BLD AUTO: 4.05 10*6/MM3 (ref 3.77–5.16)
RBC # BLD AUTO: 4.22 10*6/MM3 (ref 3.77–5.16)
RBC # BLD AUTO: 4.4 10*6/MM3 (ref 3.77–5.16)
RBC # BLD AUTO: 4.52 10*6/MM3 (ref 3.77–5.16)
RBC # BLD AUTO: 4.83 10*6/MM3 (ref 3.77–5.16)
RBC # UR: ABNORMAL /HPF
RBC MORPH BLD: NORMAL
REF LAB TEST METHOD: ABNORMAL
S PNEUM AG SPEC QL LA: NEGATIVE
SAO2 % BLDCOA: 67.7 % (ref 94–100)
SAO2 % BLDCOA: 69.2 % (ref 94–100)
SAO2 % BLDCOA: 77.1 % (ref 94–100)
SAO2 % BLDCOA: 84 % (ref 94–100)
SAO2 % BLDCOA: 85.1 %
SAO2 % BLDCOA: 86.1 %
SAO2 % BLDCOA: 86.4 % (ref 94–100)
SAO2 % BLDCOA: 87.7 % (ref 94–100)
SAO2 % BLDCOA: 88.3 % (ref 94–100)
SAO2 % BLDCOA: 92.2 %
SAO2 % BLDCOA: 94.5 % (ref 94–100)
SAO2 % BLDCOA: 96.6 %
SMALL PLATELETS BLD QL SMEAR: ADEQUATE
SODIUM BLD-SCNC: 130 MMOL/L (ref 137–145)
SODIUM BLD-SCNC: 132 MMOL/L (ref 137–145)
SODIUM BLD-SCNC: 134 MMOL/L (ref 137–145)
SODIUM BLD-SCNC: 134 MMOL/L (ref 137–145)
SODIUM BLD-SCNC: 135 MMOL/L (ref 137–145)
SODIUM BLD-SCNC: 137 MMOL/L (ref 137–145)
SODIUM BLDA-SCNC: 130.5 MMOL/L (ref 138–146)
SODIUM BLDA-SCNC: 130.8 MMOL/L (ref 138–146)
SODIUM BLDA-SCNC: 131.3 MMOL/L (ref 138–146)
SODIUM BLDA-SCNC: 131.6 MMOL/L (ref 138–146)
SODIUM BLDA-SCNC: 131.8 MMOL/L (ref 138–146)
SODIUM BLDA-SCNC: 132.4 MMOL/L (ref 138–146)
SODIUM BLDA-SCNC: 134.1 MMOL/L (ref 138–146)
SODIUM BLDA-SCNC: 134.6 MMOL/L (ref 138–146)
SODIUM BLDA-SCNC: 135.4 MMOL/L (ref 138–146)
SODIUM BLDA-SCNC: 135.4 MMOL/L (ref 138–146)
SODIUM BLDA-SCNC: 135.5 MMOL/L (ref 138–146)
SODIUM BLDA-SCNC: 136 MMOL/L (ref 138–146)
SODIUM UR-SCNC: 83 MMOL/L (ref 30–90)
SP GR UR STRIP: 1.02 (ref 1–1.03)
SP GR UR STRIP: 1.03 (ref 1–1.03)
SP GR UR STRIP: >=1.03 (ref 1–1.03)
SQUAMOUS #/AREA URNS HPF: ABNORMAL /HPF
TROPONIN I SERPL-MCNC: <0.012 NG/ML
UNIDENT CRYS URNS QL MICRO: ABNORMAL /HPF
UROBILINOGEN UR QL STRIP: ABNORMAL
VALPROATE SERPL-MCNC: <10 MCG/ML (ref 50–120)
VARIANT LYMPHS NFR BLD MANUAL: 1 % (ref 0–5)
WBC MORPH BLD: NORMAL
WBC MORPH BLD: NORMAL
WBC NRBC COR # BLD: 10.81 10*3/MM3 (ref 3.2–9.8)
WBC NRBC COR # BLD: 16.28 10*3/MM3 (ref 3.2–9.8)
WBC NRBC COR # BLD: 17.96 10*3/MM3 (ref 3.2–9.8)
WBC NRBC COR # BLD: 38.13 10*3/MM3 (ref 3.2–9.8)
WBC NRBC COR # BLD: 4.25 10*3/MM3 (ref 3.2–9.8)
WBC NRBC COR # BLD: 6.87 10*3/MM3 (ref 3.2–9.8)
WBC NRBC COR # BLD: 7.22 10*3/MM3 (ref 3.2–9.8)
WBC UR QL AUTO: ABNORMAL /HPF
WHOLE BLOOD HOLD SPECIMEN: NORMAL

## 2017-01-01 PROCEDURE — 82962 GLUCOSE BLOOD TEST: CPT

## 2017-01-01 PROCEDURE — 94003 VENT MGMT INPAT SUBQ DAY: CPT

## 2017-01-01 PROCEDURE — 93005 ELECTROCARDIOGRAM TRACING: CPT | Performed by: EMERGENCY MEDICINE

## 2017-01-01 PROCEDURE — 85025 COMPLETE CBC W/AUTO DIFF WBC: CPT | Performed by: EMERGENCY MEDICINE

## 2017-01-01 PROCEDURE — 85025 COMPLETE CBC W/AUTO DIFF WBC: CPT | Performed by: INTERNAL MEDICINE

## 2017-01-01 PROCEDURE — 96375 TX/PRO/DX INJ NEW DRUG ADDON: CPT

## 2017-01-01 PROCEDURE — 99291 CRITICAL CARE FIRST HOUR: CPT | Performed by: INTERNAL MEDICINE

## 2017-01-01 PROCEDURE — 94799 UNLISTED PULMONARY SVC/PX: CPT

## 2017-01-01 PROCEDURE — 25010000002 METHYLPREDNISOLONE PER 125 MG: Performed by: INTERNAL MEDICINE

## 2017-01-01 PROCEDURE — 82803 BLOOD GASES ANY COMBINATION: CPT | Performed by: INTERNAL MEDICINE

## 2017-01-01 PROCEDURE — 25010000002 FENTANYL CITRATE (PF) 100 MCG/2ML SOLUTION: Performed by: INTERNAL MEDICINE

## 2017-01-01 PROCEDURE — 25010000002 CEFTRIAXONE: Performed by: EMERGENCY MEDICINE

## 2017-01-01 PROCEDURE — 73060 X-RAY EXAM OF HUMERUS: CPT

## 2017-01-01 PROCEDURE — 96374 THER/PROPH/DIAG INJ IV PUSH: CPT

## 2017-01-01 PROCEDURE — 82803 BLOOD GASES ANY COMBINATION: CPT | Performed by: HOSPITALIST

## 2017-01-01 PROCEDURE — 74177 CT ABD & PELVIS W/CONTRAST: CPT

## 2017-01-01 PROCEDURE — 87040 BLOOD CULTURE FOR BACTERIA: CPT | Performed by: EMERGENCY MEDICINE

## 2017-01-01 PROCEDURE — 25010000002 LORAZEPAM PER 2 MG: Performed by: FAMILY MEDICINE

## 2017-01-01 PROCEDURE — 80053 COMPREHEN METABOLIC PANEL: CPT | Performed by: EMERGENCY MEDICINE

## 2017-01-01 PROCEDURE — 93010 ELECTROCARDIOGRAM REPORT: CPT | Performed by: INTERNAL MEDICINE

## 2017-01-01 PROCEDURE — 94002 VENT MGMT INPAT INIT DAY: CPT

## 2017-01-01 PROCEDURE — 25010000002 SUCCINYLCHOLINE PER 20 MG: Performed by: ANESTHESIOLOGY

## 2017-01-01 PROCEDURE — 83605 ASSAY OF LACTIC ACID: CPT | Performed by: EMERGENCY MEDICINE

## 2017-01-01 PROCEDURE — 87899 AGENT NOS ASSAY W/OPTIC: CPT | Performed by: INTERNAL MEDICINE

## 2017-01-01 PROCEDURE — 93306 TTE W/DOPPLER COMPLETE: CPT | Performed by: INTERNAL MEDICINE

## 2017-01-01 PROCEDURE — 76937 US GUIDE VASCULAR ACCESS: CPT

## 2017-01-01 PROCEDURE — 25010000002 ONDANSETRON PER 1 MG: Performed by: EMERGENCY MEDICINE

## 2017-01-01 PROCEDURE — 71010 HC CHEST PA OR AP: CPT

## 2017-01-01 PROCEDURE — 96365 THER/PROPH/DIAG IV INF INIT: CPT

## 2017-01-01 PROCEDURE — 25010000002 HEPARIN (PORCINE) PER 1000 UNITS: Performed by: INTERNAL MEDICINE

## 2017-01-01 PROCEDURE — 94760 N-INVAS EAR/PLS OXIMETRY 1: CPT

## 2017-01-01 PROCEDURE — 87449 NOS EACH ORGANISM AG IA: CPT | Performed by: INTERNAL MEDICINE

## 2017-01-01 PROCEDURE — C1751 CATH, INF, PER/CENT/MIDLINE: HCPCS

## 2017-01-01 PROCEDURE — 87086 URINE CULTURE/COLONY COUNT: CPT | Performed by: EMERGENCY MEDICINE

## 2017-01-01 PROCEDURE — 25010000002 KETOROLAC TROMETHAMINE PER 15 MG: Performed by: EMERGENCY MEDICINE

## 2017-01-01 PROCEDURE — 06HM33Z INSERTION OF INFUSION DEVICE INTO RIGHT FEMORAL VEIN, PERCUTANEOUS APPROACH: ICD-10-PCS | Performed by: THORACIC SURGERY (CARDIOTHORACIC VASCULAR SURGERY)

## 2017-01-01 PROCEDURE — 94640 AIRWAY INHALATION TREATMENT: CPT

## 2017-01-01 PROCEDURE — 74022 RADEX COMPL AQT ABD SERIES: CPT

## 2017-01-01 PROCEDURE — 96361 HYDRATE IV INFUSION ADD-ON: CPT

## 2017-01-01 PROCEDURE — 25010000002 METHYLPREDNISOLONE PER 125 MG: Performed by: EMERGENCY MEDICINE

## 2017-01-01 PROCEDURE — 82009 KETONE BODYS QUAL: CPT | Performed by: EMERGENCY MEDICINE

## 2017-01-01 PROCEDURE — 87070 CULTURE OTHR SPECIMN AEROBIC: CPT | Performed by: INTERNAL MEDICINE

## 2017-01-01 PROCEDURE — 83690 ASSAY OF LIPASE: CPT | Performed by: EMERGENCY MEDICINE

## 2017-01-01 PROCEDURE — 63710000001 INSULIN ASPART PER 5 UNITS: Performed by: INTERNAL MEDICINE

## 2017-01-01 PROCEDURE — 99292 CRITICAL CARE ADDL 30 MIN: CPT | Performed by: INTERNAL MEDICINE

## 2017-01-01 PROCEDURE — 81001 URINALYSIS AUTO W/SCOPE: CPT | Performed by: EMERGENCY MEDICINE

## 2017-01-01 PROCEDURE — 5A1945Z RESPIRATORY VENTILATION, 24-96 CONSECUTIVE HOURS: ICD-10-PCS | Performed by: INTERNAL MEDICINE

## 2017-01-01 PROCEDURE — 02HV33Z INSERTION OF INFUSION DEVICE INTO SUPERIOR VENA CAVA, PERCUTANEOUS APPROACH: ICD-10-PCS | Performed by: INTERNAL MEDICINE

## 2017-01-01 PROCEDURE — 99284 EMERGENCY DEPT VISIT MOD MDM: CPT

## 2017-01-01 PROCEDURE — 99285 EMERGENCY DEPT VISIT HI MDM: CPT

## 2017-01-01 PROCEDURE — 94660 CPAP INITIATION&MGMT: CPT

## 2017-01-01 PROCEDURE — 25010000002 MORPHINE SULFATE (PF) 2 MG/ML SOLUTION: Performed by: FAMILY MEDICINE

## 2017-01-01 PROCEDURE — 83605 ASSAY OF LACTIC ACID: CPT | Performed by: HOSPITALIST

## 2017-01-01 PROCEDURE — 80048 BASIC METABOLIC PNL TOTAL CA: CPT | Performed by: INTERNAL MEDICINE

## 2017-01-01 PROCEDURE — 25010000002 LEVOFLOXACIN PER 250 MG: Performed by: EMERGENCY MEDICINE

## 2017-01-01 PROCEDURE — 36600 WITHDRAWAL OF ARTERIAL BLOOD: CPT

## 2017-01-01 PROCEDURE — 36556 INSERT NON-TUNNEL CV CATH: CPT | Performed by: THORACIC SURGERY (CARDIOTHORACIC VASCULAR SURGERY)

## 2017-01-01 PROCEDURE — 25010000002 INSULIN REGULAR HUMAN PER 5 UNITS: Performed by: INTERNAL MEDICINE

## 2017-01-01 PROCEDURE — 94770: CPT

## 2017-01-01 PROCEDURE — 25010000002 LORAZEPAM PER 2 MG: Performed by: EMERGENCY MEDICINE

## 2017-01-01 PROCEDURE — 63710000001 INSULIN REGULAR HUMAN PER 5 UNITS: Performed by: EMERGENCY MEDICINE

## 2017-01-01 PROCEDURE — C1752 CATH,HEMODIALYSIS,SHORT-TERM: HCPCS

## 2017-01-01 PROCEDURE — 84300 ASSAY OF URINE SODIUM: CPT | Performed by: INTERNAL MEDICINE

## 2017-01-01 PROCEDURE — 25010000002 MORPHINE PER 10 MG: Performed by: EMERGENCY MEDICINE

## 2017-01-01 PROCEDURE — 87205 SMEAR GRAM STAIN: CPT | Performed by: HOSPITALIST

## 2017-01-01 PROCEDURE — 25010000002 ONDANSETRON PER 1 MG: Performed by: FAMILY MEDICINE

## 2017-01-01 PROCEDURE — 82947 ASSAY GLUCOSE BLOOD QUANT: CPT | Performed by: INTERNAL MEDICINE

## 2017-01-01 PROCEDURE — 85007 BL SMEAR W/DIFF WBC COUNT: CPT | Performed by: EMERGENCY MEDICINE

## 2017-01-01 PROCEDURE — 70450 CT HEAD/BRAIN W/O DYE: CPT

## 2017-01-01 PROCEDURE — 73080 X-RAY EXAM OF ELBOW: CPT

## 2017-01-01 PROCEDURE — 80053 COMPREHEN METABOLIC PANEL: CPT | Performed by: INTERNAL MEDICINE

## 2017-01-01 PROCEDURE — 36415 COLL VENOUS BLD VENIPUNCTURE: CPT

## 2017-01-01 PROCEDURE — 5A1D00Z PERFORMANCE OF URINARY FILTRATION, SINGLE: ICD-10-PCS | Performed by: THORACIC SURGERY (CARDIOTHORACIC VASCULAR SURGERY)

## 2017-01-01 PROCEDURE — 99211 OFF/OP EST MAY X REQ PHY/QHP: CPT

## 2017-01-01 PROCEDURE — 25010000002 PROPOFOL 1000 MG/ML EMULSION: Performed by: ANESTHESIOLOGY

## 2017-01-01 PROCEDURE — 96376 TX/PRO/DX INJ SAME DRUG ADON: CPT

## 2017-01-01 PROCEDURE — 73090 X-RAY EXAM OF FOREARM: CPT

## 2017-01-01 PROCEDURE — 25010000002 MIDAZOLAM 50 MG/10ML SOLUTION 10 ML VIAL: Performed by: INTERNAL MEDICINE

## 2017-01-01 PROCEDURE — 0BH17EZ INSERTION OF ENDOTRACHEAL AIRWAY INTO TRACHEA, VIA NATURAL OR ARTIFICIAL OPENING: ICD-10-PCS | Performed by: INTERNAL MEDICINE

## 2017-01-01 PROCEDURE — 25010000002 HYDROMORPHONE PER 4 MG: Performed by: FAMILY MEDICINE

## 2017-01-01 PROCEDURE — 94003 VENT MGMT INPAT SUBQ DAY: CPT | Performed by: INTERNAL MEDICINE

## 2017-01-01 PROCEDURE — 84484 ASSAY OF TROPONIN QUANT: CPT | Performed by: EMERGENCY MEDICINE

## 2017-01-01 PROCEDURE — 73030 X-RAY EXAM OF SHOULDER: CPT

## 2017-01-01 PROCEDURE — 87205 SMEAR GRAM STAIN: CPT | Performed by: INTERNAL MEDICINE

## 2017-01-01 PROCEDURE — 84703 CHORIONIC GONADOTROPIN ASSAY: CPT | Performed by: EMERGENCY MEDICINE

## 2017-01-01 PROCEDURE — 25010000002 MIDAZOLAM PER 1 MG: Performed by: INTERNAL MEDICINE

## 2017-01-01 PROCEDURE — 25010000002 LEVOFLOXACIN PER 250 MG: Performed by: HOSPITALIST

## 2017-01-01 PROCEDURE — 82800 BLOOD PH: CPT | Performed by: EMERGENCY MEDICINE

## 2017-01-01 PROCEDURE — 0 IOPAMIDOL 61 % SOLUTION: Performed by: EMERGENCY MEDICINE

## 2017-01-01 PROCEDURE — 72125 CT NECK SPINE W/O DYE: CPT

## 2017-01-01 PROCEDURE — 25010000002 PIPERACILLIN SOD-TAZOBACTAM PER 1 G: Performed by: EMERGENCY MEDICINE

## 2017-01-01 PROCEDURE — 82803 BLOOD GASES ANY COMBINATION: CPT | Performed by: EMERGENCY MEDICINE

## 2017-01-01 PROCEDURE — 25010000002 PROPOFOL 1000 MG/ML EMULSION: Performed by: INTERNAL MEDICINE

## 2017-01-01 PROCEDURE — 25010000002 PROPOFOL 10 MG/ML EMULSION: Performed by: ANESTHESIOLOGY

## 2017-01-01 PROCEDURE — 80164 ASSAY DIPROPYLACETIC ACD TOT: CPT | Performed by: EMERGENCY MEDICINE

## 2017-01-01 PROCEDURE — 25010000002 LEVOFLOXACIN PER 250 MG: Performed by: INTERNAL MEDICINE

## 2017-01-01 PROCEDURE — 83880 ASSAY OF NATRIURETIC PEPTIDE: CPT | Performed by: EMERGENCY MEDICINE

## 2017-01-01 PROCEDURE — 99232 SBSQ HOSP IP/OBS MODERATE 35: CPT | Performed by: INTERNAL MEDICINE

## 2017-01-01 PROCEDURE — 31500 INSERT EMERGENCY AIRWAY: CPT | Performed by: ANESTHESIOLOGY

## 2017-01-01 PROCEDURE — 87340 HEPATITIS B SURFACE AG IA: CPT | Performed by: INTERNAL MEDICINE

## 2017-01-01 PROCEDURE — 87070 CULTURE OTHR SPECIMN AEROBIC: CPT | Performed by: HOSPITALIST

## 2017-01-01 PROCEDURE — 25010000002 KETOROLAC TROMETHAMINE PER 15 MG: Performed by: FAMILY MEDICINE

## 2017-01-01 PROCEDURE — 99283 EMERGENCY DEPT VISIT LOW MDM: CPT

## 2017-01-01 PROCEDURE — 85027 COMPLETE CBC AUTOMATED: CPT | Performed by: INTERNAL MEDICINE

## 2017-01-01 PROCEDURE — 25010000002 LORAZEPAM PER 2 MG: Performed by: INTERNAL MEDICINE

## 2017-01-01 PROCEDURE — 82570 ASSAY OF URINE CREATININE: CPT | Performed by: INTERNAL MEDICINE

## 2017-01-01 PROCEDURE — 72128 CT CHEST SPINE W/O DYE: CPT

## 2017-01-01 PROCEDURE — 93306 TTE W/DOPPLER COMPLETE: CPT

## 2017-01-01 RX ORDER — SODIUM CHLORIDE 9 MG/ML
125 INJECTION, SOLUTION INTRAVENOUS CONTINUOUS
Status: DISCONTINUED | OUTPATIENT
Start: 2017-01-01 | End: 2017-01-01 | Stop reason: HOSPADM

## 2017-01-01 RX ORDER — FAMOTIDINE 10 MG/ML
20 INJECTION, SOLUTION INTRAVENOUS 2 TIMES DAILY
Status: DISCONTINUED | OUTPATIENT
Start: 2017-01-01 | End: 2017-01-01

## 2017-01-01 RX ORDER — ATORVASTATIN CALCIUM 20 MG/1
20 TABLET, FILM COATED ORAL DAILY
COMMUNITY

## 2017-01-01 RX ORDER — LEVOFLOXACIN 5 MG/ML
500 INJECTION, SOLUTION INTRAVENOUS EVERY 24 HOURS
Status: DISCONTINUED | OUTPATIENT
Start: 2017-01-01 | End: 2017-01-01

## 2017-01-01 RX ORDER — FENTANYL CITRATE 50 UG/ML
25 INJECTION, SOLUTION INTRAMUSCULAR; INTRAVENOUS
Status: DISCONTINUED | OUTPATIENT
Start: 2017-01-01 | End: 2017-01-01 | Stop reason: HOSPADM

## 2017-01-01 RX ORDER — VECURONIUM BROMIDE 1 MG/ML
100 INJECTION, POWDER, LYOPHILIZED, FOR SOLUTION INTRAVENOUS ONCE
Status: DISCONTINUED | OUTPATIENT
Start: 2017-01-01 | End: 2017-01-01 | Stop reason: SDUPTHER

## 2017-01-01 RX ORDER — METHYLPREDNISOLONE SODIUM SUCCINATE 125 MG/2ML
40 INJECTION, POWDER, LYOPHILIZED, FOR SOLUTION INTRAMUSCULAR; INTRAVENOUS ONCE
Status: COMPLETED | OUTPATIENT
Start: 2017-01-01 | End: 2017-01-01

## 2017-01-01 RX ORDER — PROPOFOL 10 MG/ML
VIAL (ML) INTRAVENOUS AS NEEDED
Status: DISCONTINUED | OUTPATIENT
Start: 2017-01-01 | End: 2017-01-01 | Stop reason: HOSPADM

## 2017-01-01 RX ORDER — IPRATROPIUM BROMIDE AND ALBUTEROL SULFATE 2.5; .5 MG/3ML; MG/3ML
3 SOLUTION RESPIRATORY (INHALATION)
Status: DISCONTINUED | OUTPATIENT
Start: 2017-01-01 | End: 2017-01-01

## 2017-01-01 RX ORDER — HEPARIN SODIUM 1000 [USP'U]/ML
2000 INJECTION, SOLUTION INTRAVENOUS; SUBCUTANEOUS AS NEEDED
Status: DISCONTINUED | OUTPATIENT
Start: 2017-01-01 | End: 2017-01-01

## 2017-01-01 RX ORDER — MULTIPLE VITAMINS W/ MINERALS TAB 9MG-400MCG
1 TAB ORAL DAILY
COMMUNITY

## 2017-01-01 RX ORDER — MIDAZOLAM HYDROCHLORIDE 1 MG/ML
1 INJECTION INTRAMUSCULAR; INTRAVENOUS
Status: DISCONTINUED | OUTPATIENT
Start: 2017-01-01 | End: 2017-01-01

## 2017-01-01 RX ORDER — KETOROLAC TROMETHAMINE 30 MG/ML
30 INJECTION, SOLUTION INTRAMUSCULAR; INTRAVENOUS ONCE
Status: COMPLETED | OUTPATIENT
Start: 2017-01-01 | End: 2017-01-01

## 2017-01-01 RX ORDER — SODIUM CHLORIDE 0.9 % (FLUSH) 0.9 %
1-10 SYRINGE (ML) INJECTION AS NEEDED
Status: DISCONTINUED | OUTPATIENT
Start: 2017-01-01 | End: 2017-01-01 | Stop reason: HOSPADM

## 2017-01-01 RX ORDER — SULFAMETHOXAZOLE AND TRIMETHOPRIM 800; 160 MG/1; MG/1
1 TABLET ORAL 2 TIMES DAILY
Qty: 20 TABLET | Refills: 0 | Status: SHIPPED | OUTPATIENT
Start: 2017-01-01

## 2017-01-01 RX ORDER — ONDANSETRON 2 MG/ML
4 INJECTION INTRAMUSCULAR; INTRAVENOUS ONCE
Status: COMPLETED | OUTPATIENT
Start: 2017-01-01 | End: 2017-01-01

## 2017-01-01 RX ORDER — DEXTROSE MONOHYDRATE 25 G/50ML
25 INJECTION, SOLUTION INTRAVENOUS
Status: DISCONTINUED | OUTPATIENT
Start: 2017-01-01 | End: 2017-01-01 | Stop reason: SDUPTHER

## 2017-01-01 RX ORDER — SODIUM CHLORIDE 0.9 % (FLUSH) 0.9 %
10 SYRINGE (ML) INJECTION AS NEEDED
Status: DISCONTINUED | OUTPATIENT
Start: 2017-01-01 | End: 2017-01-01

## 2017-01-01 RX ORDER — QUETIAPINE 300 MG/1
300 TABLET, FILM COATED, EXTENDED RELEASE ORAL 2 TIMES DAILY
COMMUNITY

## 2017-01-01 RX ORDER — METHYLPREDNISOLONE SODIUM SUCCINATE 125 MG/2ML
40 INJECTION, POWDER, LYOPHILIZED, FOR SOLUTION INTRAMUSCULAR; INTRAVENOUS EVERY 8 HOURS
Status: DISCONTINUED | OUTPATIENT
Start: 2017-01-01 | End: 2017-01-01

## 2017-01-01 RX ORDER — NYSTATIN 100000 [USP'U]/G
POWDER TOPICAL EVERY 12 HOURS SCHEDULED
Status: DISCONTINUED | OUTPATIENT
Start: 2017-01-01 | End: 2017-01-01

## 2017-01-01 RX ORDER — PHENAZOPYRIDINE HYDROCHLORIDE 100 MG/1
100 TABLET, FILM COATED ORAL 3 TIMES DAILY PRN
Qty: 20 TABLET | Refills: 0 | Status: SHIPPED | OUTPATIENT
Start: 2017-01-01

## 2017-01-01 RX ORDER — SODIUM CHLORIDE 9 MG/ML
75 INJECTION, SOLUTION INTRAVENOUS CONTINUOUS
Status: DISCONTINUED | OUTPATIENT
Start: 2017-01-01 | End: 2017-01-01

## 2017-01-01 RX ORDER — LORAZEPAM 2 MG/ML
0.5 INJECTION INTRAMUSCULAR ONCE
Status: COMPLETED | OUTPATIENT
Start: 2017-01-01 | End: 2017-01-01

## 2017-01-01 RX ORDER — LOSARTAN POTASSIUM 25 MG/1
25 TABLET ORAL DAILY
COMMUNITY

## 2017-01-01 RX ORDER — CEPHALEXIN 500 MG/1
500 CAPSULE ORAL 3 TIMES DAILY
Qty: 21 CAPSULE | Refills: 0 | Status: SHIPPED | OUTPATIENT
Start: 2017-01-01 | End: 2017-01-01

## 2017-01-01 RX ORDER — ACETAMINOPHEN 160 MG/5ML
650 SOLUTION ORAL EVERY 6 HOURS PRN
Status: DISCONTINUED | OUTPATIENT
Start: 2017-01-01 | End: 2017-01-01

## 2017-01-01 RX ORDER — ALBUTEROL SULFATE 90 UG/1
4 AEROSOL, METERED RESPIRATORY (INHALATION)
Status: DISCONTINUED | OUTPATIENT
Start: 2017-01-01 | End: 2017-01-01

## 2017-01-01 RX ORDER — METHYLPREDNISOLONE SODIUM SUCCINATE 125 MG/2ML
125 INJECTION, POWDER, LYOPHILIZED, FOR SOLUTION INTRAMUSCULAR; INTRAVENOUS ONCE
Status: COMPLETED | OUTPATIENT
Start: 2017-01-01 | End: 2017-01-01

## 2017-01-01 RX ORDER — LEVOFLOXACIN 5 MG/ML
250 INJECTION, SOLUTION INTRAVENOUS
Status: DISCONTINUED | OUTPATIENT
Start: 2017-01-01 | End: 2017-01-01

## 2017-01-01 RX ORDER — NICOTINE POLACRILEX 4 MG
15 LOZENGE BUCCAL
Status: DISCONTINUED | OUTPATIENT
Start: 2017-01-01 | End: 2017-01-01 | Stop reason: SDUPTHER

## 2017-01-01 RX ORDER — IPRATROPIUM BROMIDE AND ALBUTEROL SULFATE 2.5; .5 MG/3ML; MG/3ML
3 SOLUTION RESPIRATORY (INHALATION) ONCE
Status: COMPLETED | OUTPATIENT
Start: 2017-01-01 | End: 2017-01-01

## 2017-01-01 RX ORDER — FLUCONAZOLE 150 MG/1
TABLET ORAL
Qty: 1 TABLET | Refills: 0 | Status: SHIPPED | OUTPATIENT
Start: 2017-01-01

## 2017-01-01 RX ORDER — HEPARIN SODIUM 1000 [USP'U]/ML
3000 INJECTION, SOLUTION INTRAVENOUS; SUBCUTANEOUS AS NEEDED
Status: DISCONTINUED | OUTPATIENT
Start: 2017-01-01 | End: 2017-01-01

## 2017-01-01 RX ORDER — LORAZEPAM 2 MG/ML
1 INJECTION INTRAMUSCULAR EVERY 4 HOURS PRN
Status: DISCONTINUED | OUTPATIENT
Start: 2017-01-01 | End: 2017-01-01 | Stop reason: HOSPADM

## 2017-01-01 RX ORDER — MORPHINE SULFATE 2 MG/ML
2 INJECTION, SOLUTION INTRAMUSCULAR; INTRAVENOUS
Status: DISCONTINUED | OUTPATIENT
Start: 2017-01-01 | End: 2017-01-01

## 2017-01-01 RX ORDER — IBUPROFEN 800 MG/1
800 TABLET ORAL ONCE
Status: COMPLETED | OUTPATIENT
Start: 2017-01-01 | End: 2017-01-01

## 2017-01-01 RX ORDER — SODIUM CHLORIDE 0.9 % (FLUSH) 0.9 %
10 SYRINGE (ML) INJECTION AS NEEDED
Status: DISCONTINUED | OUTPATIENT
Start: 2017-01-01 | End: 2017-01-01 | Stop reason: HOSPADM

## 2017-01-01 RX ORDER — SUCCINYLCHOLINE CHLORIDE 20 MG/ML
INJECTION INTRAMUSCULAR; INTRAVENOUS AS NEEDED
Status: DISCONTINUED | OUTPATIENT
Start: 2017-01-01 | End: 2017-01-01 | Stop reason: HOSPADM

## 2017-01-01 RX ORDER — SULFAMETHOXAZOLE AND TRIMETHOPRIM 800; 160 MG/1; MG/1
1 TABLET ORAL ONCE
Status: COMPLETED | OUTPATIENT
Start: 2017-01-01 | End: 2017-01-01

## 2017-01-01 RX ORDER — LEVOFLOXACIN 5 MG/ML
750 INJECTION, SOLUTION INTRAVENOUS EVERY 24 HOURS
Status: DISCONTINUED | OUTPATIENT
Start: 2017-01-01 | End: 2017-01-01

## 2017-01-01 RX ORDER — LORAZEPAM 2 MG/ML
0.5 INJECTION INTRAMUSCULAR EVERY 6 HOURS PRN
Status: DISCONTINUED | OUTPATIENT
Start: 2017-01-01 | End: 2017-01-01

## 2017-01-01 RX ORDER — VECURONIUM BROMIDE 20 MG/20ML
13.7 INJECTION, POWDER, LYOPHILIZED, FOR SOLUTION INTRAVENOUS ONCE
Status: COMPLETED | OUTPATIENT
Start: 2017-01-01 | End: 2017-01-01

## 2017-01-01 RX ORDER — HEPARIN SODIUM 5000 [USP'U]/ML
5000 INJECTION, SOLUTION INTRAVENOUS; SUBCUTANEOUS EVERY 12 HOURS SCHEDULED
Status: DISCONTINUED | OUTPATIENT
Start: 2017-01-01 | End: 2017-01-01

## 2017-01-01 RX ORDER — METHYLPREDNISOLONE SODIUM SUCCINATE 125 MG/2ML
60 INJECTION, POWDER, LYOPHILIZED, FOR SOLUTION INTRAMUSCULAR; INTRAVENOUS EVERY 8 HOURS
Status: DISCONTINUED | OUTPATIENT
Start: 2017-01-01 | End: 2017-01-01

## 2017-01-01 RX ORDER — MORPHINE SULFATE 4 MG/ML
4 INJECTION, SOLUTION INTRAMUSCULAR; INTRAVENOUS ONCE
Status: COMPLETED | OUTPATIENT
Start: 2017-01-01 | End: 2017-01-01

## 2017-01-01 RX ORDER — SCOLOPAMINE TRANSDERMAL SYSTEM 1 MG/1
1 PATCH, EXTENDED RELEASE TRANSDERMAL
Status: DISCONTINUED | OUTPATIENT
Start: 2017-01-01 | End: 2017-01-01 | Stop reason: HOSPADM

## 2017-01-01 RX ORDER — LEVOTHYROXINE SODIUM 0.15 MG/1
300 TABLET ORAL DAILY
Status: DISCONTINUED | OUTPATIENT
Start: 2017-01-01 | End: 2017-01-01

## 2017-01-01 RX ORDER — DEXTROSE MONOHYDRATE 25 G/50ML
25 INJECTION, SOLUTION INTRAVENOUS
Status: DISCONTINUED | OUTPATIENT
Start: 2017-01-01 | End: 2017-01-01

## 2017-01-01 RX ORDER — LEVOFLOXACIN 5 MG/ML
750 INJECTION, SOLUTION INTRAVENOUS ONCE
Status: COMPLETED | OUTPATIENT
Start: 2017-01-01 | End: 2017-01-01

## 2017-01-01 RX ORDER — NICOTINE POLACRILEX 4 MG
15 LOZENGE BUCCAL
Status: DISCONTINUED | OUTPATIENT
Start: 2017-01-01 | End: 2017-01-01

## 2017-01-01 RX ORDER — CHLORHEXIDINE GLUCONATE 0.12 MG/ML
15 RINSE ORAL EVERY 12 HOURS SCHEDULED
Status: DISCONTINUED | OUTPATIENT
Start: 2017-01-01 | End: 2017-01-01

## 2017-01-01 RX ORDER — HYDROCODONE BITARTRATE AND ACETAMINOPHEN 7.5; 325 MG/1; MG/1
1 TABLET ORAL EVERY 6 HOURS PRN
Qty: 15 TABLET | Refills: 0 | Status: SHIPPED | OUTPATIENT
Start: 2017-01-01

## 2017-01-01 RX ORDER — LEVOTHYROXINE SODIUM 300 UG/1
300 TABLET ORAL DAILY
COMMUNITY

## 2017-01-01 RX ORDER — VECURONIUM BROMIDE 1 MG/ML
100 INJECTION, POWDER, LYOPHILIZED, FOR SOLUTION INTRAVENOUS ONCE
Status: DISCONTINUED | OUTPATIENT
Start: 2017-01-01 | End: 2017-01-01 | Stop reason: CLARIF

## 2017-01-01 RX ORDER — NAPROXEN 500 MG/1
500 TABLET ORAL 2 TIMES DAILY WITH MEALS
Qty: 10 TABLET | Refills: 0 | Status: SHIPPED | OUTPATIENT
Start: 2017-01-01

## 2017-01-01 RX ORDER — SODIUM CHLORIDE 9 MG/ML
60 INJECTION, SOLUTION INTRAVENOUS CONTINUOUS
Status: DISCONTINUED | OUTPATIENT
Start: 2017-01-01 | End: 2017-01-01

## 2017-01-01 RX ORDER — LEVOFLOXACIN 5 MG/ML
250 INJECTION, SOLUTION INTRAVENOUS 3 TIMES WEEKLY
Status: DISCONTINUED | OUTPATIENT
Start: 2017-01-01 | End: 2017-01-01

## 2017-01-01 RX ORDER — MORPHINE SULFATE 2 MG/ML
1 INJECTION, SOLUTION INTRAMUSCULAR; INTRAVENOUS EVERY 4 HOURS PRN
Status: DISCONTINUED | OUTPATIENT
Start: 2017-01-01 | End: 2017-01-01 | Stop reason: HOSPADM

## 2017-01-01 RX ORDER — LIDOCAINE AND PRILOCAINE 25; 25 MG/G; MG/G
CREAM TOPICAL AS NEEDED
Status: DISCONTINUED | OUTPATIENT
Start: 2017-01-01 | End: 2017-01-01

## 2017-01-01 RX ORDER — SODIUM CHLORIDE 9 MG/ML
1000 INJECTION, SOLUTION INTRAVENOUS ONCE
Status: COMPLETED | OUTPATIENT
Start: 2017-01-01 | End: 2017-01-01

## 2017-01-01 RX ORDER — GABAPENTIN 600 MG/1
600 TABLET ORAL 3 TIMES DAILY
COMMUNITY

## 2017-01-01 RX ADMIN — HEPARIN SODIUM 5000 UNITS: 5000 INJECTION, SOLUTION INTRAVENOUS; SUBCUTANEOUS at 13:35

## 2017-01-01 RX ADMIN — TAZOBACTAM SODIUM AND PIPERACILLIN SODIUM 4.5 G: 500; 4 INJECTION, SOLUTION INTRAVENOUS at 13:36

## 2017-01-01 RX ADMIN — LEVOTHYROXINE SODIUM 300 MCG: 150 TABLET ORAL at 17:29

## 2017-01-01 RX ADMIN — SODIUM BICARBONATE 125 ML/HR: 84 INJECTION INTRAVENOUS at 20:58

## 2017-01-01 RX ADMIN — CHLORHEXIDINE GLUCONATE 15 ML: 1.2 RINSE ORAL at 11:36

## 2017-01-01 RX ADMIN — MORPHINE SULFATE 4 MG: 4 INJECTION, SOLUTION INTRAMUSCULAR; INTRAVENOUS at 19:16

## 2017-01-01 RX ADMIN — HEPARIN SODIUM 5000 UNITS: 5000 INJECTION, SOLUTION INTRAVENOUS; SUBCUTANEOUS at 08:27

## 2017-01-01 RX ADMIN — MINERAL OIL AND PETROLATUM: 150; 830 OINTMENT OPHTHALMIC at 10:47

## 2017-01-01 RX ADMIN — NYSTATIN: 100000 POWDER TOPICAL at 21:00

## 2017-01-01 RX ADMIN — METHYLPREDNISOLONE SODIUM SUCCINATE 40 MG: 125 INJECTION, POWDER, FOR SOLUTION INTRAMUSCULAR; INTRAVENOUS at 13:34

## 2017-01-01 RX ADMIN — SODIUM CHLORIDE 125 ML/HR: 9 INJECTION, SOLUTION INTRAVENOUS at 06:07

## 2017-01-01 RX ADMIN — MIDAZOLAM 1 MG: 1 INJECTION INTRAMUSCULAR; INTRAVENOUS at 22:34

## 2017-01-01 RX ADMIN — FAMOTIDINE 20 MG: 10 INJECTION, SOLUTION INTRAVENOUS at 17:35

## 2017-01-01 RX ADMIN — SODIUM CHLORIDE 10 UNITS/HR: 9 INJECTION, SOLUTION INTRAVENOUS at 13:19

## 2017-01-01 RX ADMIN — METHYLPREDNISOLONE SODIUM SUCCINATE 60 MG: 125 INJECTION, POWDER, FOR SOLUTION INTRAMUSCULAR; INTRAVENOUS at 20:32

## 2017-01-01 RX ADMIN — MINERAL OIL AND PETROLATUM: 150; 830 OINTMENT OPHTHALMIC at 11:24

## 2017-01-01 RX ADMIN — HEPARIN SODIUM 5000 UNITS: 5000 INJECTION, SOLUTION INTRAVENOUS; SUBCUTANEOUS at 21:55

## 2017-01-01 RX ADMIN — SODIUM CHLORIDE 2000 ML: 9 INJECTION, SOLUTION INTRAVENOUS at 06:49

## 2017-01-01 RX ADMIN — IBUPROFEN 800 MG: 800 TABLET ORAL at 06:14

## 2017-01-01 RX ADMIN — MINERAL OIL AND PETROLATUM: 150; 830 OINTMENT OPHTHALMIC at 23:56

## 2017-01-01 RX ADMIN — ALBUTEROL SULFATE 4 PUFF: 90 AEROSOL, METERED RESPIRATORY (INHALATION) at 07:15

## 2017-01-01 RX ADMIN — ALBUTEROL SULFATE 4 PUFF: 90 AEROSOL, METERED RESPIRATORY (INHALATION) at 02:55

## 2017-01-01 RX ADMIN — METHYLPREDNISOLONE SODIUM SUCCINATE 125 MG: 125 INJECTION, POWDER, FOR SOLUTION INTRAMUSCULAR; INTRAVENOUS at 07:07

## 2017-01-01 RX ADMIN — SODIUM BICARBONATE 125 ML/HR: 84 INJECTION INTRAVENOUS at 06:29

## 2017-01-01 RX ADMIN — SODIUM CHLORIDE 1000 ML: 900 INJECTION, SOLUTION INTRAVENOUS at 07:48

## 2017-01-01 RX ADMIN — IPRATROPIUM BROMIDE 4 PUFF: 17 AEROSOL, METERED RESPIRATORY (INHALATION) at 10:53

## 2017-01-01 RX ADMIN — SODIUM CHLORIDE 60 ML/HR: 900 INJECTION, SOLUTION INTRAVENOUS at 14:47

## 2017-01-01 RX ADMIN — HEPARIN SODIUM 5000 UNITS: 5000 INJECTION, SOLUTION INTRAVENOUS; SUBCUTANEOUS at 20:58

## 2017-01-01 RX ADMIN — SODIUM CHLORIDE 60 ML/HR: 900 INJECTION, SOLUTION INTRAVENOUS at 07:55

## 2017-01-01 RX ADMIN — PROPOFOL 80 MCG/KG/MIN: 10 INJECTION, EMULSION INTRAVENOUS at 19:38

## 2017-01-01 RX ADMIN — ONDANSETRON 4 MG: 2 INJECTION INTRAMUSCULAR; INTRAVENOUS at 22:27

## 2017-01-01 RX ADMIN — LEVOFLOXACIN 500 MG: 5 INJECTION, SOLUTION INTRAVENOUS at 08:23

## 2017-01-01 RX ADMIN — LEVOFLOXACIN 750 MG: 5 INJECTION, SOLUTION INTRAVENOUS at 07:12

## 2017-01-01 RX ADMIN — MINERAL OIL AND PETROLATUM: 150; 830 OINTMENT OPHTHALMIC at 13:35

## 2017-01-01 RX ADMIN — MINERAL OIL AND PETROLATUM: 150; 830 OINTMENT OPHTHALMIC at 08:29

## 2017-01-01 RX ADMIN — LEVOTHYROXINE SODIUM 300 MCG: 150 TABLET ORAL at 08:27

## 2017-01-01 RX ADMIN — ONDANSETRON 4 MG: 2 INJECTION INTRAMUSCULAR; INTRAVENOUS at 20:36

## 2017-01-01 RX ADMIN — Medication 10 ML: at 18:38

## 2017-01-01 RX ADMIN — LEVOTHYROXINE SODIUM 300 MCG: 150 TABLET ORAL at 10:47

## 2017-01-01 RX ADMIN — IPRATROPIUM BROMIDE 4 PUFF: 17 AEROSOL, METERED RESPIRATORY (INHALATION) at 23:32

## 2017-01-01 RX ADMIN — ALBUTEROL SULFATE 4 PUFF: 90 AEROSOL, METERED RESPIRATORY (INHALATION) at 10:53

## 2017-01-01 RX ADMIN — METHYLPREDNISOLONE SODIUM SUCCINATE 60 MG: 125 INJECTION, POWDER, FOR SOLUTION INTRAMUSCULAR; INTRAVENOUS at 04:46

## 2017-01-01 RX ADMIN — NOREPINEPHRINE BITARTRATE 0.24 MCG/KG/MIN: 1 INJECTION INTRAVENOUS at 09:18

## 2017-01-01 RX ADMIN — MINERAL OIL AND PETROLATUM: 150; 830 OINTMENT OPHTHALMIC at 17:00

## 2017-01-01 RX ADMIN — FAMOTIDINE 20 MG: 10 INJECTION, SOLUTION INTRAVENOUS at 20:31

## 2017-01-01 RX ADMIN — METHYLPREDNISOLONE SODIUM SUCCINATE 60 MG: 125 INJECTION, POWDER, FOR SOLUTION INTRAMUSCULAR; INTRAVENOUS at 12:50

## 2017-01-01 RX ADMIN — KETOROLAC TROMETHAMINE 30 MG: 30 INJECTION, SOLUTION INTRAMUSCULAR at 10:19

## 2017-01-01 RX ADMIN — HEPARIN SODIUM 5000 UNITS: 5000 INJECTION, SOLUTION INTRAVENOUS; SUBCUTANEOUS at 11:24

## 2017-01-01 RX ADMIN — MINERAL OIL AND PETROLATUM: 150; 830 OINTMENT OPHTHALMIC at 01:25

## 2017-01-01 RX ADMIN — MORPHINE SULFATE 1 MG: 2 INJECTION, SOLUTION INTRAMUSCULAR; INTRAVENOUS at 11:52

## 2017-01-01 RX ADMIN — SODIUM CHLORIDE 75 ML/HR: 9 INJECTION, SOLUTION INTRAVENOUS at 10:35

## 2017-01-01 RX ADMIN — SODIUM CHLORIDE 7.2 UNITS/HR: 9 INJECTION, SOLUTION INTRAVENOUS at 20:38

## 2017-01-01 RX ADMIN — SUCCINYLCHOLINE CHLORIDE 140 MG: 20 INJECTION, SOLUTION INTRAMUSCULAR; INTRAVENOUS at 14:25

## 2017-01-01 RX ADMIN — MINERAL OIL AND PETROLATUM: 150; 830 OINTMENT OPHTHALMIC at 16:24

## 2017-01-01 RX ADMIN — MINERAL OIL AND PETROLATUM: 150; 830 OINTMENT OPHTHALMIC at 01:00

## 2017-01-01 RX ADMIN — NYSTATIN: 100000 POWDER TOPICAL at 20:59

## 2017-01-01 RX ADMIN — LEVOTHYROXINE SODIUM 300 MCG: 150 TABLET ORAL at 08:37

## 2017-01-01 RX ADMIN — TAZOBACTAM SODIUM AND PIPERACILLIN SODIUM 4.5 G: 500; 4 INJECTION, SOLUTION INTRAVENOUS at 08:15

## 2017-01-01 RX ADMIN — SODIUM CHLORIDE 15 UNITS/HR: 9 INJECTION, SOLUTION INTRAVENOUS at 19:36

## 2017-01-01 RX ADMIN — SODIUM CHLORIDE 1000 ML: 9 INJECTION, SOLUTION INTRAVENOUS at 16:46

## 2017-01-01 RX ADMIN — NYSTATIN: 100000 POWDER TOPICAL at 08:28

## 2017-01-01 RX ADMIN — IPRATROPIUM BROMIDE AND ALBUTEROL SULFATE 3 ML: 2.5; .5 SOLUTION RESPIRATORY (INHALATION) at 07:10

## 2017-01-01 RX ADMIN — IPRATROPIUM BROMIDE 4 PUFF: 17 AEROSOL, METERED RESPIRATORY (INHALATION) at 02:56

## 2017-01-01 RX ADMIN — METHYLPREDNISOLONE SODIUM SUCCINATE 60 MG: 125 INJECTION, POWDER, FOR SOLUTION INTRAMUSCULAR; INTRAVENOUS at 21:55

## 2017-01-01 RX ADMIN — METHYLPREDNISOLONE SODIUM SUCCINATE 60 MG: 125 INJECTION, POWDER, FOR SOLUTION INTRAMUSCULAR; INTRAVENOUS at 21:00

## 2017-01-01 RX ADMIN — CHLORHEXIDINE GLUCONATE 15 ML: 1.2 RINSE ORAL at 08:37

## 2017-01-01 RX ADMIN — FAMOTIDINE 20 MG: 10 INJECTION, SOLUTION INTRAVENOUS at 10:47

## 2017-01-01 RX ADMIN — HUMAN INSULIN 10 UNITS: 100 INJECTION, SOLUTION SUBCUTANEOUS at 07:52

## 2017-01-01 RX ADMIN — METHYLPREDNISOLONE SODIUM SUCCINATE 60 MG: 125 INJECTION, POWDER, FOR SOLUTION INTRAMUSCULAR; INTRAVENOUS at 05:29

## 2017-01-01 RX ADMIN — SODIUM CHLORIDE 1000 ML: 9 INJECTION, SOLUTION INTRAVENOUS at 07:58

## 2017-01-01 RX ADMIN — NOREPINEPHRINE BITARTRATE 0.23 MCG/KG/MIN: 1 INJECTION INTRAVENOUS at 04:33

## 2017-01-01 RX ADMIN — SODIUM CHLORIDE 75 ML/HR: 900 INJECTION, SOLUTION INTRAVENOUS at 04:40

## 2017-01-01 RX ADMIN — LORAZEPAM 0.5 MG: 2 INJECTION INTRAMUSCULAR; INTRAVENOUS at 11:25

## 2017-01-01 RX ADMIN — HUMAN INSULIN 10 UNITS: 100 INJECTION, SOLUTION SUBCUTANEOUS at 18:35

## 2017-01-01 RX ADMIN — ONDANSETRON 4 MG: 2 INJECTION INTRAMUSCULAR; INTRAVENOUS at 19:16

## 2017-01-01 RX ADMIN — METHYLPREDNISOLONE SODIUM SUCCINATE 60 MG: 125 INJECTION, POWDER, FOR SOLUTION INTRAMUSCULAR; INTRAVENOUS at 08:46

## 2017-01-01 RX ADMIN — INSULIN ASPART 3 UNITS: 100 INJECTION, SOLUTION INTRAVENOUS; SUBCUTANEOUS at 09:25

## 2017-01-01 RX ADMIN — SODIUM CHLORIDE 17.6 UNITS/HR: 9 INJECTION, SOLUTION INTRAVENOUS at 16:09

## 2017-01-01 RX ADMIN — LORAZEPAM 1 MG: 2 INJECTION INTRAMUSCULAR; INTRAVENOUS at 11:52

## 2017-01-01 RX ADMIN — PROPOFOL 50 MCG/KG/MIN: 10 INJECTION, EMULSION INTRAVENOUS at 14:30

## 2017-01-01 RX ADMIN — INSULIN ASPART 4 UNITS: 100 INJECTION, SOLUTION INTRAVENOUS; SUBCUTANEOUS at 16:56

## 2017-01-01 RX ADMIN — DEXTROSE MONOHYDRATE 5 G: 25 INJECTION, SOLUTION INTRAVENOUS at 11:24

## 2017-01-01 RX ADMIN — IPRATROPIUM BROMIDE 4 PUFF: 17 AEROSOL, METERED RESPIRATORY (INHALATION) at 15:56

## 2017-01-01 RX ADMIN — VECURONIUM BROMIDE 13.7 MG: 1 INJECTION, POWDER, LYOPHILIZED, FOR SOLUTION INTRAVENOUS at 21:03

## 2017-01-01 RX ADMIN — PROPOFOL 79.93 MCG/KG/MIN: 10 INJECTION, EMULSION INTRAVENOUS at 21:15

## 2017-01-01 RX ADMIN — NYSTATIN: 100000 POWDER TOPICAL at 21:56

## 2017-01-01 RX ADMIN — CHLORHEXIDINE GLUCONATE 15 ML: 1.2 RINSE ORAL at 21:55

## 2017-01-01 RX ADMIN — NOREPINEPHRINE BITARTRATE 0.02 MCG/KG/MIN: 1 INJECTION INTRAVENOUS at 22:13

## 2017-01-01 RX ADMIN — ACETAMINOPHEN 650 MG: 160 SOLUTION ORAL at 07:55

## 2017-01-01 RX ADMIN — SODIUM CHLORIDE 4 UNITS/HR: 9 INJECTION, SOLUTION INTRAVENOUS at 02:52

## 2017-01-01 RX ADMIN — FAMOTIDINE 20 MG: 10 INJECTION, SOLUTION INTRAVENOUS at 17:52

## 2017-01-01 RX ADMIN — NOREPINEPHRINE BITARTRATE 0.23 MCG/KG/MIN: 1 INJECTION INTRAVENOUS at 22:30

## 2017-01-01 RX ADMIN — PROPOFOL 79.93 MCG/KG/MIN: 10 INJECTION, EMULSION INTRAVENOUS at 00:48

## 2017-01-01 RX ADMIN — CEFTRIAXONE 1 G: 1 INJECTION, POWDER, FOR SOLUTION INTRAMUSCULAR; INTRAVENOUS at 20:55

## 2017-01-01 RX ADMIN — MINERAL OIL AND PETROLATUM: 150; 830 OINTMENT OPHTHALMIC at 19:33

## 2017-01-01 RX ADMIN — SODIUM CHLORIDE 15 UNITS/HR: 9 INJECTION, SOLUTION INTRAVENOUS at 14:19

## 2017-01-01 RX ADMIN — MINERAL OIL AND PETROLATUM: 150; 830 OINTMENT OPHTHALMIC at 03:59

## 2017-01-01 RX ADMIN — CHLORHEXIDINE GLUCONATE 15 ML: 1.2 RINSE ORAL at 20:59

## 2017-01-01 RX ADMIN — CHLORHEXIDINE GLUCONATE 15 ML: 1.2 RINSE ORAL at 10:47

## 2017-01-01 RX ADMIN — LORAZEPAM 0.5 MG: 2 INJECTION INTRAMUSCULAR; INTRAVENOUS at 07:07

## 2017-01-01 RX ADMIN — NYSTATIN: 100000 POWDER TOPICAL at 09:18

## 2017-01-01 RX ADMIN — SODIUM BICARBONATE 50 MEQ: 84 INJECTION INTRAVENOUS at 20:58

## 2017-01-01 RX ADMIN — ALBUTEROL SULFATE 4 PUFF: 90 AEROSOL, METERED RESPIRATORY (INHALATION) at 23:29

## 2017-01-01 RX ADMIN — SODIUM CHLORIDE 125 ML/HR: 9 INJECTION, SOLUTION INTRAVENOUS at 18:37

## 2017-01-01 RX ADMIN — IPRATROPIUM BROMIDE 4 PUFF: 17 AEROSOL, METERED RESPIRATORY (INHALATION) at 07:14

## 2017-01-01 RX ADMIN — METHYLPREDNISOLONE SODIUM SUCCINATE 60 MG: 125 INJECTION, POWDER, FOR SOLUTION INTRAMUSCULAR; INTRAVENOUS at 12:41

## 2017-01-01 RX ADMIN — SODIUM CHLORIDE 1000 ML: 9 INJECTION, SOLUTION INTRAVENOUS at 18:37

## 2017-01-01 RX ADMIN — NYSTATIN: 100000 POWDER TOPICAL at 10:47

## 2017-01-01 RX ADMIN — SULFAMETHOXAZOLE AND TRIMETHOPRIM 160 MG: 800; 160 TABLET ORAL at 10:18

## 2017-01-01 RX ADMIN — ALBUTEROL SULFATE 4 PUFF: 90 AEROSOL, METERED RESPIRATORY (INHALATION) at 11:37

## 2017-01-01 RX ADMIN — IOPAMIDOL 95 ML: 612 INJECTION, SOLUTION INTRAVENOUS at 19:29

## 2017-01-01 RX ADMIN — VALPROATE SODIUM 1000 MG: 100 INJECTION, SOLUTION INTRAVENOUS at 08:04

## 2017-01-01 RX ADMIN — METHYLPREDNISOLONE SODIUM SUCCINATE 60 MG: 125 INJECTION, POWDER, FOR SOLUTION INTRAMUSCULAR; INTRAVENOUS at 04:24

## 2017-01-01 RX ADMIN — HEPARIN SODIUM 5000 UNITS: 5000 INJECTION, SOLUTION INTRAVENOUS; SUBCUTANEOUS at 10:47

## 2017-01-01 RX ADMIN — Medication 10 ML: at 07:07

## 2017-01-01 RX ADMIN — HEPARIN SODIUM 5000 UNITS: 5000 INJECTION, SOLUTION INTRAVENOUS; SUBCUTANEOUS at 20:32

## 2017-01-01 RX ADMIN — PROPOFOL 79.93 MCG/KG/MIN: 10 INJECTION, EMULSION INTRAVENOUS at 21:55

## 2017-01-01 RX ADMIN — MINERAL OIL AND PETROLATUM: 150; 830 OINTMENT OPHTHALMIC at 21:00

## 2017-01-01 RX ADMIN — MINERAL OIL AND PETROLATUM: 150; 830 OINTMENT OPHTHALMIC at 04:11

## 2017-01-01 RX ADMIN — PROPOFOL 55 MCG/KG/MIN: 10 INJECTION, EMULSION INTRAVENOUS at 02:54

## 2017-01-01 RX ADMIN — HEPARIN SODIUM 5000 UNITS: 5000 INJECTION, SOLUTION INTRAVENOUS; SUBCUTANEOUS at 08:46

## 2017-01-01 RX ADMIN — Medication 10 ML: at 07:58

## 2017-01-01 RX ADMIN — TAZOBACTAM SODIUM AND PIPERACILLIN SODIUM 4.5 G: 500; 4 INJECTION, SOLUTION INTRAVENOUS at 18:03

## 2017-01-01 RX ADMIN — SODIUM BICARBONATE 125 ML/HR: 84 INJECTION INTRAVENOUS at 17:01

## 2017-01-01 RX ADMIN — IPRATROPIUM BROMIDE 4 PUFF: 17 AEROSOL, METERED RESPIRATORY (INHALATION) at 11:47

## 2017-01-01 RX ADMIN — VECURONIUM BROMIDE 0.6 MCG/KG/MIN: 1 INJECTION, POWDER, LYOPHILIZED, FOR SOLUTION INTRAVENOUS at 21:18

## 2017-01-01 RX ADMIN — FENTANYL CITRATE 25 MCG: 50 INJECTION, SOLUTION INTRAMUSCULAR; INTRAVENOUS at 21:30

## 2017-01-01 RX ADMIN — PROPOFOL 150 MG: 10 INJECTION, EMULSION INTRAVENOUS at 14:25

## 2017-01-01 RX ADMIN — CHLORHEXIDINE GLUCONATE 15 ML: 1.2 RINSE ORAL at 20:31

## 2017-01-01 RX ADMIN — NYSTATIN: 100000 POWDER TOPICAL at 08:37

## 2017-01-01 RX ADMIN — LEVOFLOXACIN 250 MG: 5 INJECTION, SOLUTION INTRAVENOUS at 06:04

## 2017-01-01 RX ADMIN — SODIUM CHLORIDE 250 ML: 9 INJECTION, SOLUTION INTRAVENOUS at 04:39

## 2017-01-01 RX ADMIN — CHLORHEXIDINE GLUCONATE 15 ML: 1.2 RINSE ORAL at 08:27

## 2017-01-01 RX ADMIN — HUMAN INSULIN 10 UNITS: 100 INJECTION, SOLUTION SUBCUTANEOUS at 06:59

## 2017-01-01 RX ADMIN — ALBUTEROL SULFATE 4 PUFF: 90 AEROSOL, METERED RESPIRATORY (INHALATION) at 15:55

## 2017-01-01 RX ADMIN — FAMOTIDINE 20 MG: 10 INJECTION, SOLUTION INTRAVENOUS at 18:47

## 2017-01-01 RX ADMIN — SCOPALAMINE 1 PATCH: 1 PATCH, EXTENDED RELEASE TRANSDERMAL at 12:02

## 2017-01-01 RX ADMIN — MIDAZOLAM 1 MG/HR: 5 INJECTION INTRAMUSCULAR; INTRAVENOUS at 18:32

## 2017-01-01 RX ADMIN — Medication 10 ML: at 17:02

## 2017-01-01 RX ADMIN — PROPOFOL 80 MCG/KG/MIN: 10 INJECTION, EMULSION INTRAVENOUS at 17:55

## 2017-01-01 RX ADMIN — METHYLPREDNISOLONE SODIUM SUCCINATE 60 MG: 125 INJECTION, POWDER, FOR SOLUTION INTRAMUSCULAR; INTRAVENOUS at 13:35

## 2017-01-01 RX ADMIN — FAMOTIDINE 20 MG: 10 INJECTION, SOLUTION INTRAVENOUS at 08:27

## 2017-01-01 RX ADMIN — IPRATROPIUM BROMIDE AND ALBUTEROL SULFATE 3 ML: 2.5; .5 SOLUTION RESPIRATORY (INHALATION) at 09:48

## 2017-01-01 RX ADMIN — FAMOTIDINE 20 MG: 10 INJECTION, SOLUTION INTRAVENOUS at 08:37

## 2017-01-01 RX ADMIN — SODIUM CHLORIDE 1000 ML: 900 INJECTION, SOLUTION INTRAVENOUS at 08:54

## 2017-01-01 RX ADMIN — FAMOTIDINE 20 MG: 10 INJECTION, SOLUTION INTRAVENOUS at 11:24

## 2017-01-01 RX ADMIN — HYDROMORPHONE HYDROCHLORIDE 1 MG: 1 INJECTION, SOLUTION INTRAMUSCULAR; INTRAVENOUS; SUBCUTANEOUS at 19:41

## 2017-01-01 RX ADMIN — KETOROLAC TROMETHAMINE 30 MG: 30 INJECTION, SOLUTION INTRAMUSCULAR; INTRAVENOUS at 20:41

## 2017-02-23 NOTE — DISCHARGE INSTRUCTIONS
Have enlarged liver and need follow-up with GI doctor for further testing and management.  Monitor your blood sugars regularly.  Follow up with primary care physician.  Return to ER for worsening of symptoms.  Finish course of antibiotics for urinary tract infections.

## 2017-02-23 NOTE — ED PROVIDER NOTES
Subjective   Patient is a 41 y.o. female presenting with hyperglycemia.   History provided by:  Patient  Hyperglycemia   Blood sugar level PTA:  604  Severity:  Moderate  Onset quality:  Gradual  Timing:  Constant  Progression:  Worsening  Chronicity:  Recurrent  Current diabetic treatments: uncontrolled DM.  Current diabetic therapy:  LEVEMIR 55unit/day,  novolog 2 time, glucophage 1000mg bid   Time since last antidiabetic medication:  5 hours  Context: recent change in diet    Relieved by:  Nothing  Ineffective treatments:  None tried  Associated symptoms: abdominal pain, fatigue, increased thirst and polyuria    Associated symptoms: no chest pain, no confusion, no dehydration, no dizziness, no fever, no nausea, no shortness of breath, no vomiting, no weakness and no weight change    Abdominal pain:     Location:  Generalized    Quality: bloating and burning      Severity:  Mild    Onset quality:  Gradual    Timing:  Intermittent    Progression:  Waxing and waning    Chronicity:  New  Fatigue:     Severity:  Moderate  Risk factors: family hx of diabetes and obesity    Risk factors: no hx of DKA and no pregnancy        Review of Systems   Constitutional: Positive for fatigue. Negative for activity change, chills and fever.   HENT: Negative for congestion, facial swelling, rhinorrhea and sinus pressure.    Eyes: Negative for photophobia and visual disturbance.   Respiratory: Negative for cough, chest tightness, shortness of breath and wheezing.    Cardiovascular: Negative for chest pain.   Gastrointestinal: Positive for abdominal pain. Negative for abdominal distention, diarrhea, nausea and vomiting.   Endocrine: Positive for polydipsia and polyuria.   Genitourinary: Negative for flank pain.   Musculoskeletal: Negative for back pain, joint swelling and neck pain.   Skin: Negative for rash.   Neurological: Negative for dizziness, seizures, weakness, numbness and headaches.   Hematological: Negative for adenopathy.    Psychiatric/Behavioral: Negative for agitation and confusion.       Past Medical History   Diagnosis Date   • Diabetes mellitus    • Hyperlipidemia    • Hypertension    • Migraine        Allergies   Allergen Reactions   • Erythromycin Anaphylaxis and Rash     And nausea   • Amoxicillin Nausea And Vomiting   • Codeine Nausea And Vomiting     Can take when she eats with this medication   • Bupropion Rash       History reviewed. No pertinent past surgical history.    History reviewed. No pertinent family history.    Social History     Social History   • Marital status:      Spouse name: N/A   • Number of children: N/A   • Years of education: N/A     Social History Main Topics   • Smoking status: Current Every Day Smoker     Types: Cigarettes   • Smokeless tobacco: None      Comment: 1 pack per week   • Alcohol use No   • Drug use: No   • Sexual activity: No     Other Topics Concern   • None     Social History Narrative   • None           Objective   Physical Exam   Constitutional: She is oriented to person, place, and time. She appears well-developed and well-nourished. No distress.   HENT:   Head: Normocephalic and atraumatic.   Eyes: Conjunctivae and EOM are normal. Pupils are equal, round, and reactive to light.   Neck: Normal range of motion. Neck supple.   Cardiovascular: Normal rate, regular rhythm and normal heart sounds.    Pulmonary/Chest: Effort normal and breath sounds normal. No respiratory distress. She has no wheezes.   Abdominal: Soft. Bowel sounds are normal. She exhibits no distension. There is no tenderness. There is no rebound and no guarding.   Musculoskeletal: Normal range of motion. She exhibits no edema or deformity.   Neurological: She is alert and oriented to person, place, and time.   Skin: Skin is warm and dry. No rash noted.   Psychiatric: She has a normal mood and affect.   Nursing note and vitals reviewed.      Procedures         ED Course  ED Course   Comment By Time   She has  hyperglycemia but not in DKA.  She is given insulin and fluid.  Blood sugar is coming down.  Abdominal x-ray showed irregular gas pattern and I have obtained CT abdomen and pelvis with contrast which showed hepatomegaly with hepatic steatosis.  She is feeling after IV fluids.  Oxygen saturation dropped after she was given morphine.  She has no difficulty breathing.  X-ray chest is negative.  She does not have a primary care physician at present and recently moved in this area.  She would be given referral to primary care and GI doctor Kimmy.  She has urinary tract infection and is given a dose of Rocephin and here.  He'll be placed on Flexeril to go home with.  Discussed signs and symptoms of worsening and need to come back which she understands and agrees. Daniel Lunsford MD 02/22 2108          Labs Reviewed   URINALYSIS W/ CULTURE IF INDICATED - Abnormal; Notable for the following:        Result Value    Appearance, UA Cloudy (*)     Glucose, UA >=1000 mg/dL (3+) (*)     Blood, UA Trace (*)     Protein, UA 30 mg/dL (1+) (*)     Leuk Esterase, UA Moderate (2+) (*)     All other components within normal limits   COMPREHENSIVE METABOLIC PANEL - Abnormal; Notable for the following:     Glucose 546 (*)     Sodium 135 (*)     Chloride 93 (*)     ALT (SGPT) 53 (*)     AST (SGOT) 60 (*)     Alkaline Phosphatase 169 (*)     Globulin 4.1 (*)     A/G Ratio 1.0 (*)     Anion Gap 17.0 (*)     All other components within normal limits   CBC WITH AUTO DIFFERENTIAL - Abnormal; Notable for the following:     RDW-SD 47.0 (*)     Immature Grans % 0.9 (*)     Immature Grans, Absolute 0.06 (*)     All other components within normal limits   URINALYSIS, MICROSCOPIC ONLY - Abnormal; Notable for the following:     WBC, UA Too Numerous to Count (*)     Bacteria, UA Trace (*)     All other components within normal limits   POCT GLUCOSE FINGERSTICK - Abnormal; Notable for the following:     Glucose 354 (*)     All other components within  normal limits   POCT GLUCOSE FINGERSTICK - Abnormal; Notable for the following:     Glucose 329 (*)     All other components within normal limits   URINE CULTURE - Normal   PH, VENOUS - Normal   LIPASE - Normal   ACETONE - Normal   RAINBOW DRAW    Narrative:     The following orders were created for panel order [place] Draw.  Procedure                               Abnormality         Status                     ---------                               -----------         ------                     Light Blue Top[[id number]]                                    Final result               Green Top (Gel)[[id number]]                                   Final result               Lavender Top[[id number]]                                      Final result               Gold Top - SST[[id number]]                                    Final result                 Please view results for these tests on the individual orders.   CBC AND DIFFERENTIAL    Narrative:     The following orders were created for panel order CBC & Differential.  Procedure                               Abnormality         Status                     ---------                               -----------         ------                     CBC Auto Differential[[id number]]         Abnormal            Final result                 Please view results for these tests on the individual orders.   LIGHT BLUE TOP   GREEN TOP   LAVENDER TOP   GOLD TOP - SST   KETONE BODIES SERUM    Narrative:     The following orders were created for panel order Ketone Bodies, Serum.  Procedure                               Abnormality         Status                     ---------                               -----------         ------                     Acetone[[id number]]                       Normal              Final result                 Please view results for these tests on the individual orders.       Xr Abdomen 2 View With Chest 1 View    Result Date: 2/22/2017  Narrative: Patient Name:   CAROLE JAVIER Patient ID:  [id number] Ordering:  REBECCA OLIVAREZ Attending:  REBECCA OLIVAREZ Referring:  REBECCA OLIVAREZ ------------------------------------------------ DATE OF PROCEDURE:  2/22/2017 6:15 PM CST ACUTE ABDOMEN SERIES WITH PA CHEST INDICATION FOR PROCEDURE:  41 years -old patient presents for evaluation of abdominal pain. Patient has elevated blood sugar. COMPARISON:  None. FINDINGS:  The PA view of the chest reveals the lungs are expanded. There is mild prominence of bronchovascular markings. There is no radiographic evidence for airspace consolidation, pleural effusion or pneumothorax. Mediastinal and cardiac silhouettes are within normal limits. There is no radiographic evidence for pneumoperitoneum. Three AP views of the abdomen are obtained with the patient supine and upright. There is no obvious organomegaly, mass, or dilated bowel. Very little bowel gas.     Impression: CONCLUSION: 1.  Mild pulmonary congestion. 2.  Very little bowel gas with increased attenuation suggesting possible ascites. Electronically signed by:  Humera Broussard MD  2/22/2017 6:34 PM CST Workstation: [email or web address]    Ct Abdomen Pelvis With Contrast    Result Date: 2/22/2017  Narrative: Patient Name:  CAROLE JAVIER Patient ID:  [id number] Ordering:  REBECCA OLIVAREZ Attending:  REBECCA OLIVAREZ Referring:  REBECCA OLIVAREZ ------------------------------------------------ DATE OF PROCEDURE:  2/22/2017 7:21 PM CST PROCEDURE: CT ABDOMEN AND PELVIS WITH IV CONTRAST INDICATION FOR PROCEDURE:   41 years -old patient presents for evaluation of upper abdominal pain and vomiting. TECHNIQUE: Contiguous axial images were obtained from lung bases to the proximal thighs after intravenous administration of 95 mL of Isovue-300.   Oral contrast was not administered. Multiplanar reformations are submitted for interpretation. Dose length product is 4132.  Images were acquired in accordance with the principles of ALARA (as low as reasonably allowable). COMPARISON:   None FINDINGS: What is seen of the chest wall has a normal appearance. What is seen of the heart has a normal appearance without obvious pericardial effusion. Lung bases are clear. No pleural effusions are visible. The liver has decreased attenuation and is mildly enlarged without obvious mass or dilated intrahepatic biliary ducts. The liver measures up to 30.3 cm in greatest cephalocaudal dimension. The gallbladder is present. The spleen has a normal appearance. Both adrenal glands are within normal limits. The pancreas has a normal appearance. Both kidneys have a normal appearance without obvious perinephric fluid or hydronephrosis. There is no evidence for hydroureter or radiopaque ureteral calculi. There appears to be a renal cystic mass arising from the posterior cortex of the left kidney. This has attenuation of approximately 30 Hounsfield units suggesting this is a complex cystic mass.. This measures approximately 2.1 cm in size. The urinary bladder has a normal appearance. Uterus is not visualized suggesting patient has had a hysterectomy. The distal esophagus has a normal appearance. The stomach has a normal appearance. There is no obvious dilated bowel, ascites or pneumoperitoneum.  The small bowel has a normal appearance. Stool is visualized throughout the colon.  Scattered colonic diverticula are visualized.  No abnormal appendix is visualized. Abdominal aorta has a normal tapered appearance with minimal atherosclerotic calcification. The inferior vena cava has a normal appearance. The abdominal wall has an umbilical hernia containing fat. There also appears to be a large ventral hernia containing several loops of small bowel and colon. There is no evidence for dilated bowel. Imaged thoracic and lumbar vertebral bodies have normal height and alignment. Bones are mildly osteopenic. Intervertebral disc spaces are within normal limits.  Bony pelvis has a normal CT appearance.     Impression: 1.  Massive  enlargement of the liver with hepatic steatosis. 2.  Nonspecific cystic mass arising from left kidney. 3.  Ventral hernia containing bowel without definite bowel obstruction. Electronically signed by:  Humera Broussard MD  2/22/2017 8:20 PM CST Workstation: [id number]              [facility]    Final diagnoses:   Hyperglycemia   Uncontrolled type 2 diabetes mellitus with complication, unspecified long term insulin use status   Acute UTI   Hepatomegaly   Ventral hernia without obstruction or gangrene            Daniel Lunsford MD  02/24/17 0427

## 2017-03-26 NOTE — ED NOTES
Patient decided to leave without being seen, patient stated she was not signing any paper and wanted her IV removed     Canelo Wong, RN  03/26/17 3621

## 2017-06-12 NOTE — ED PROVIDER NOTES
Subjective   History of Present Illness    Review of Systems    Past Medical History:   Diagnosis Date   • Diabetes mellitus    • Hyperlipidemia    • Hypertension    • Migraine        Allergies   Allergen Reactions   • Erythromycin Anaphylaxis and Rash     And nausea   • Amoxicillin Nausea And Vomiting   • Codeine Nausea And Vomiting     Can take when she eats with this medication   • Bupropion Rash       Past Surgical History:   Procedure Laterality Date   •  SECTION         History reviewed. No pertinent family history.    Social History     Social History   • Marital status:      Spouse name: N/A   • Number of children: N/A   • Years of education: N/A     Social History Main Topics   • Smoking status: Current Every Day Smoker     Packs/day: 1.00     Types: Cigarettes   • Smokeless tobacco: None      Comment: 1 pack per week   • Alcohol use No   • Drug use: No   • Sexual activity: No     Other Topics Concern   • None     Social History Narrative   • None           Objective   Physical Exam    Procedures         ED Course  ED Course   Comment By Time   Checkout Dr. Santana. Pending labs/depakote IV load. Mykel Elmore MD  [phone]                  [facility]    Final diagnoses:   Seizure   Type 2 diabetes mellitus with hyperglycemia, unspecified long term insulin use status   Contusion of right elbow, initial encounter   Elevated LFTs   Cystitis            Javon Santana MD  [phone]

## 2017-06-12 NOTE — ED NOTES
Pt refusing urine and blood will wait for results elbow xray      Payton Cuevas, RN  06/12/17 6824

## 2017-06-12 NOTE — ED NOTES
Pt is requesting something for her elbow pain.  Dr. Santana made aware.      Roxanne Baldwin RN  06/12/17 3350

## 2017-06-12 NOTE — ED PROVIDER NOTES
"Subjective   HPI Comments: 41yo female pmh significant morbid obesity/htn/hyperlipidemia/dm2/mdd/dvt/epilepsy, presents ED via EMS with reported possible generalized seizure activity.  Pt states \"they told me I had a seizure.\"  Pt has no recollection of events immediately preceeding ED visit.  Pt c/o right elbow/arm pain.    Patient is a 42 y.o. female presenting with seizures.   Seizures   Seizure type:  Unable to specify  Initial focality:  None  Postictal symptoms: no confusion, no memory loss and no somnolence    Return to baseline: yes    Timing:  Unable to specify      Review of Systems   Constitutional: Negative for fever.   Eyes: Negative.    Respiratory: Negative.    Cardiovascular: Negative.    Gastrointestinal: Negative.    Musculoskeletal: Positive for arthralgias. Negative for back pain.   Skin: Negative.    Neurological: Positive for seizures and headaches.       Past Medical History:   Diagnosis Date   • Diabetes mellitus    • Hyperlipidemia    • Hypertension    • Migraine        Allergies   Allergen Reactions   • Erythromycin Anaphylaxis and Rash     And nausea   • Amoxicillin Nausea And Vomiting   • Codeine Nausea And Vomiting     Can take when she eats with this medication   • Bupropion Rash       Past Surgical History:   Procedure Laterality Date   •  SECTION         History reviewed. No pertinent family history.    Social History     Social History   • Marital status:      Spouse name: N/A   • Number of children: N/A   • Years of education: N/A     Social History Main Topics   • Smoking status: Current Every Day Smoker     Packs/day: 1.00     Types: Cigarettes   • Smokeless tobacco: None      Comment: 1 pack per week   • Alcohol use No   • Drug use: No   • Sexual activity: No     Other Topics Concern   • None     Social History Narrative   • None           Objective   Physical Exam   Constitutional: She is oriented to person, place, and time. She appears well-developed and " well-nourished.   HENT:   Head: Normocephalic and atraumatic.   Right Ear: External ear normal.   Left Ear: External ear normal.   Nose: Nose normal.   Mouth/Throat: Oropharynx is clear and moist.   Eyes: EOM are normal. Pupils are equal, round, and reactive to light.   Neck: Normal range of motion. Neck supple. No JVD present. No tracheal deviation present. No thyromegaly present.   Cardiovascular: Normal rate, regular rhythm, normal heart sounds and intact distal pulses.  Exam reveals no gallop and no friction rub.    No murmur heard.  Pulmonary/Chest: Effort normal and breath sounds normal. She has no wheezes. She has no rales.   Abdominal: Soft. Bowel sounds are normal. There is no tenderness. There is no rebound and no guarding.   Musculoskeletal: She exhibits no edema.        Right elbow: She exhibits decreased range of motion. She exhibits no swelling, no deformity and no laceration. Tenderness found.        Arms:  Lymphadenopathy:     She has no cervical adenopathy.   Neurological: She is alert and oriented to person, place, and time. She has normal strength. No cranial nerve deficit or sensory deficit. GCS eye subscore is 4. GCS verbal subscore is 5. GCS motor subscore is 6.   Skin: Skin is warm and dry.   Nursing note and vitals reviewed.      Procedures         ED Course  ED Course      Labs Reviewed   VALPROIC ACID LEVEL, TOTAL - Abnormal; Notable for the following:        Result Value    Valproic Acid <10.0 (*)     All other components within normal limits   BLOOD GAS, ARTERIAL - Abnormal; Notable for the following:     pO2, Arterial 65.5 (*)     Sodium, Arterial 134.1 (*)     All other components within normal limits   COMPREHENSIVE METABOLIC PANEL - Abnormal; Notable for the following:     Glucose 454 (*)     Sodium 134 (*)     ALT (SGPT) 58 (*)     AST (SGOT) 76 (*)     Alkaline Phosphatase 229 (*)     Globulin 4.1 (*)     A/G Ratio 1.0 (*)     All other components within normal limits   URINALYSIS  W/ CULTURE IF INDICATED   ACETONE   HCG, SERUM, QUALITATIVE   CBC WITH AUTO DIFFERENTIAL   POCT GLUCOSE FINGERSTICK   CBC AND DIFFERENTIAL    Narrative:     The following orders were created for panel order CBC & Differential.  Procedure                               Abnormality         Status                     ---------                               -----------         ------                     Scan Slide[[id number]]                                                                  CBC Auto Differential[[id number]]                                                         Please view results for these tests on the individual orders.     Xr Humerus Right    Result Date: 6/12/2017  Narrative: Right humerus two view on 6/12/2017 CLINICAL INDICATION: Pain after fall COMPARISON: None FINDINGS: There are no fractures. Visualized joints are well aligned. No bony abnormality is noted.     Impression: No acute bony abnormality. Electronically signed by:  Jorge Leary  6/12/2017 6:17 AM [employer] Workstation: [id number]    Xr Elbow 3+ View Right    Result Date: 6/12/2017  Narrative: Right elbow three view on 6/12/2017 CLINICAL INDICATION: Pain after fall COMPARISON: None FINDINGS: There are no fractures. Visualized joints are well aligned. No joint effusion is noted to suggest an occult fracture. No bony abnormality is noted.     Impression: No acute abnormality. Electronically signed by:  Jorge Leary  6/12/2017 6:19 AM CDT Workstation: [id number]    Xr Forearm 2 View Right    Result Date: 6/12/2017  Narrative: Right forearm two view on 6/12/2017 CLINICAL INDICATION: Pain after fall COMPARISON: None FINDINGS: There are no fractures. Vascular calcifications are noted. Visualized joints are well aligned. No bony abnormality is noted.     Impression: No acute abnormality. Electronically signed by:  Jorge Leary  6/12/2017 6:19 AM CDT Workstation: [id number]    Ct Head Without Contrast    Result Date:  6/12/2017  Narrative: CT head without contrast on  6/11/2017 CLINICAL INDICATION: Seizure TECHNIQUE: Multiple axial images are obtained throughout the head without the administration of contrast. This study was performed with techniques to keep radiation doses as low as reasonably achievable, (ALARA). Total DLP is 1063.8 mGy*cm. COMPARISON: None FINDINGS:   There is no hydrocephalus. There is no CT evidence of acute infarct. There is no hemorrhage. There are no abnormal extra-axial fluid collections. There is no mass, mass effect or midline shift. No bony abnormality is noted.     Impression: No acute intracranial abnormality. Electronically signed by:  Jorge Leary  6/12/2017 6:05 AM CDT Workstation: [id number]    Xr Chest 1 View    Result Date: 6/12/2017  Narrative: Chest single view on  6/12/2017 CLINICAL INDICATION: Seizure COMPARISON: 2/22/2017 FINDINGS: This is a lower volume inspiration film. Heart is upper limits normal for size. Lungs are clear. Hilar and mediastinal contours are within normal limits.     Impression: Lower volume inspiration film with otherwise no acute disease. Electronically signed by:  Jorge Leary  6/12/2017 6:18 AM CDT Workstation: [id number]                [facility]    Final diagnoses:   Seizure   Type 2 diabetes mellitus with hyperglycemia, unspecified long term insulin use status   Contusion of right elbow, initial encounter   Elevated LFTs            Mykel Elmore MD  06/12/17 0646

## 2017-06-21 NOTE — DISCHARGE INSTRUCTIONS
Ice shoulder, neck, and back for 30-4- minutes 3-4 times a day as needed. Follow up with PCP and orthopedics if no improvement.
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
## 2017-07-06 PROBLEM — G47.33 OBSTRUCTIVE APNEA: Chronic | Status: ACTIVE | Noted: 2017-01-01

## 2017-07-06 PROBLEM — I27.20 PULMONARY HYPERTENSION (HCC): Chronic | Status: ACTIVE | Noted: 2017-01-01

## 2017-07-06 PROBLEM — J96.01 ACUTE RESPIRATORY FAILURE WITH HYPOXIA AND HYPERCAPNIA (HCC): Status: ACTIVE | Noted: 2017-01-01

## 2017-07-06 PROBLEM — J18.9 BILATERAL PNEUMONIA: Status: ACTIVE | Noted: 2017-01-01

## 2017-07-06 PROBLEM — N18.30 CKD (CHRONIC KIDNEY DISEASE) STAGE 3, GFR 30-59 ML/MIN (HCC): Chronic | Status: ACTIVE | Noted: 2017-01-01

## 2017-07-06 PROBLEM — E66.01 MORBID OBESITY (HCC): Chronic | Status: ACTIVE | Noted: 2017-01-01

## 2017-07-06 PROBLEM — J80 ARDS (ADULT RESPIRATORY DISTRESS SYNDROME) (HCC): Status: ACTIVE | Noted: 2017-01-01

## 2017-07-06 PROBLEM — J96.02 ACUTE RESPIRATORY FAILURE WITH HYPOXIA AND HYPERCAPNIA (HCC): Status: ACTIVE | Noted: 2017-01-01

## 2017-07-06 PROBLEM — R79.89 ABNORMAL LFTS: Status: ACTIVE | Noted: 2017-01-01

## 2017-07-06 PROBLEM — A48.1 LEGIONELLA INFECTION (HCC): Status: ACTIVE | Noted: 2017-01-01

## 2017-07-06 PROBLEM — N39.0 UTI (URINARY TRACT INFECTION): Status: ACTIVE | Noted: 2017-01-01

## 2017-07-06 PROBLEM — IMO0002 DIABETES MELLITUS TYPE 2, UNCONTROLLED: Chronic | Status: ACTIVE | Noted: 2017-01-01

## 2017-07-06 PROBLEM — A41.9 SEPSIS (HCC): Status: ACTIVE | Noted: 2017-01-01

## 2017-07-06 NOTE — CONSULTS
CRITICAL CARE CONSULT NOTE  Thao Broussard MD    [facility] CRITICAL CARE  7/6/2017        Lili Méndez  42 y.o. female  1975  [id number]            Requesting physician: Daniel Lunsford MD    Reason for Consultation:  Refractory hypoxemic respiratory failure    CC: unable to obtain    Subjective     History of Present Illness:  Lili Méndez is a 42 y.o. female with PMH significant for LONI noncompliant with Bipap, morbid obesity, poorly controlled DM, stage 3 CKD, and tobacco use who was admitted earlier this morning with hypoxemic respiratory failure and ARDS. Per report, pt presented to the ED with dyspnea. She was noted to be hypoxemic so she was quickly placed on a NRB. Her sats were stabilized briefly, but she soon desatted despite NRB. She was asked if she wanted mechanical ventilation, but she refused. Pt also refused bipap. She continued to have low sats but was awake and mentating. Later this afternoon, the pt agreed to be intubated following several providers' urging, and she was intubated easily by anesthesia. After placing her on the vent her sats remained low so I was consulted to assist. Currently, she is on AC//16/100%/12 with sats of 77%. She is breathing spontaneously and having facial movements despite propofol of 65. PICC has been placed for access and her BP is holding.    Review of Systems:   Review of Systems   Unable to perform ROS: Intubated       All systems were reviewed and negative except as noted above in the HPI.    Home Meds:  Prescriptions Prior to Admission   Medication Sig Dispense Refill Last Dose   • atorvastatin (LIPITOR) 20 MG tablet Take 20 mg by mouth Daily.      • fluconazole (DIFLUCAN) 150 MG tablet Take it after completion of course of antibiotics 1 tablet 0    • gabapentin (NEURONTIN) 600 MG tablet Take 600 mg by mouth 3 (Three) Times a Day.      • HYDROcodone-acetaminophen (NORCO) 7.5-325 MG per tablet Take 1 tablet by mouth Every 6 (Six)  Hours As Needed for Moderate Pain (4-6). 15 tablet 0    • insulin detemir (LEVEMIR) 100 UNIT/ML injection Inject 55 Units under the skin Every Night.      • levothyroxine (SYNTHROID, LEVOTHROID) 300 MCG tablet Take 300 mcg by mouth Daily.      • losartan (COZAAR) 25 MG tablet Take 25 mg by mouth Daily.      • Multiple Vitamins-Minerals (MULTIVITAMIN WITH MINERALS) tablet tablet Take 1 tablet by mouth Daily.      • naproxen (NAPROSYN) 500 MG tablet Take 1 tablet by mouth 2 (Two) Times a Day With Meals. 10 tablet 0    • phenazopyridine (PYRIDIUM) 100 MG tablet Take 1 tablet by mouth 3 (Three) Times a Day As Needed for bladder spasms. 20 tablet 0    • QUEtiapine XR (SEROquel XR) 300 MG 24 hr tablet Take 300 mg by mouth 2 (Two) Times a Day.      • sulfamethoxazole-trimethoprim (BACTRIM DS,SEPTRA DS) 800-160 MG per tablet Take 1 tablet by mouth 2 (Two) Times a Day. 20 tablet 0        Inpatient Meds:    Current Facility-Administered Medications:   •  chlorhexidine (PERIDEX) 0.12 % solution 15 mL, 15 mL, Mouth/Throat, Q12H, William Lew MD  •  dextrose (D50W) solution 25 g, 25 g, Intravenous, Q15 Min PRN, William Lew MD  •  dextrose (GLUTOSE) oral gel 15 g, 15 g, Oral, Q15 Min PRN, William Lew MD  •  famotidine (PEPCID) injection 20 mg, 20 mg, Intravenous, BID, William Lew MD  •  fentaNYL citrate (PF) (SUBLIMAZE) injection 25 mcg, 25 mcg, Intravenous, Q1H PRN, Thao Broussard MD  •  glucagon (human recombinant) (GLUCAGEN DIAGNOSTIC) injection 1 mg, 1 mg, Subcutaneous, Q15 Min PRN, William Lew MD  •  heparin (porcine) 5000 UNIT/ML injection 5,000 Units, 5,000 Units, Subcutaneous, Q12H, William Lew MD, 5,000 Units at 07/06/17 1335  •  insulin aspart (novoLOG) injection 0-9 Units, 0-9 Units, Subcutaneous, 4x Daily AC & at Bedtime, William Lew MD  •  insulin regular (HumuLIN R,NovoLIN R) 100 Units in sodium chloride 0.9 % 100 mL (1 Units/mL) infusion, 10 Units/hr, Intravenous, Titrated, William Lew,  MD  •  [START ON 2017] levoFLOXacin (LEVAQUIN) 500 mg/100 mL D5W (premix) (LEVAQUIN) 500 mg, 500 mg, Intravenous, Once, William Lew MD  •  levothyroxine (SYNTHROID, LEVOTHROID) tablet 300 mcg, 300 mcg, Oral, Daily, William Lew MD  •  methylPREDNISolone sodium succinate (SOLU-Medrol) injection 60 mg, 60 mg, Intravenous, Q8H, Thao Broussard MD  •  midazolam (VERSED) 50 mg in sodium chloride 0.9 % 100 mL (0.5 mg/mL) infusion, 1-10 mg/hr, Intravenous, Titrated, William Lew MD  •  midazolam (VERSED) injection 1 mg, 1 mg, Intravenous, Q1H PRN, Thao Broussard MD  •  nystatin (MYCOSTATIN) powder, , Topical, Q12H, William Lew MD  •  piperacillin-tazobactam (ZOSYN) 4.5 g in iso-osmotic dextrose 100 mL IVPB (premix), 4.5 g, Intravenous, Q6H, Last Rate: 0 mL/hr at 17 0850, 4.5 g at 17 1336 **AND** [DISCONTINUED] levoFLOXacin (LEVAQUIN) 750 mg/150 mL D5W (premix) (LEVAQUIN) 750 mg, 750 mg, Intravenous, Q24H, Daniel Lunsford MD  •  propofol (DIPRIVAN) infusion 10 mg/mL 100 mL, 5-50 mcg/kg/min, Intravenous, Titrated, Damian Hairston MD, Last Rate: 41.1 mL/hr at 17 1430, 50 mcg/kg/min at 17 1430  •  sodium chloride 0.9 % flush 1-10 mL, 1-10 mL, Intravenous, PRN, William Lew MD  •  sodium chloride 0.9 % flush 10 mL, 10 mL, Intravenous, PRN, Daniel Lunsford MD, 10 mL at 17 0707    Allergies:  Allergies   Allergen Reactions   • Erythromycin Anaphylaxis and Rash     And nausea   • Amoxicillin Nausea And Vomiting   • Codeine Nausea And Vomiting     Can take when she eats with this medication   • Bupropion Rash       Past Medical History:  Past Medical History:   Diagnosis Date   • Diabetes mellitus    • Hyperlipidemia    • Hypertension    • Migraine        Past Surgical History:  Past Surgical History:   Procedure Laterality Date   •  SECTION          Social History:   Social History     Social History   • Marital status:      Spouse name: N/A   • Number of children: N/A   •  Years of education: N/A     Occupational History   • Not on file.     Social History Main Topics   • Smoking status: Current Every Day Smoker     Packs/day: 1.00     Types: Cigarettes   • Smokeless tobacco: Not on file      Comment: 1 pack per week   • Alcohol use No   • Drug use: No   • Sexual activity: No     Other Topics Concern   • Not on file     Social History Narrative   • No narrative on file       Family History:  Family History   Problem Relation Age of Onset   • Hypertension Mother        Objective     Vital Sign Min/Max for last 24 hours:  Temp  Min: 98 °F (36.7 °C)  Max: 98 °F (36.7 °C)   BP  Min: 90/42  Max: 144/66   Pulse  Min: 86  Max: 106   Resp  Min: 22  Max: 30   SpO2  Min: 67 %  Max: 94 %   Flow (L/min)  Min: 6  Max: 14   Weight  Min: 302 lb (137 kg)  Max: 302 lb (137 kg)     Physical Exam:  98 °F (36.7 °C) (Oral) 106 141/72 28 (!) 81% (!) 302 lb (137 kg) Body mass index is 55.24 kg/(m^2).  Physical Exam   Constitutional: She appears well-developed and well-nourished. She is sedated and intubated.   Morbid obesity   HENT:   Head: Normocephalic and atraumatic.   Nose: Nose normal.   Mouth/Throat: Oropharynx is clear and moist and mucous membranes are normal.   ETT, OGT   Eyes: Conjunctivae, EOM and lids are normal.   Neck: Trachea normal. Neck supple. No thyroid mass present.   Cardiovascular: Regular rhythm and normal heart sounds.  Tachycardia present.  Exam reveals no gallop.    No murmur heard.  Pulmonary/Chest: Effort normal. Tachypnea noted. She is intubated. She has no wheezes.   Coarse BS bilaterally   Abdominal: Soft. Normal appearance and bowel sounds are normal.   obese   Lymphadenopathy:        Head (right side): No submandibular adenopathy present.        Head (left side): No submandibular adenopathy present.     She has no cervical adenopathy.        Right: No supraclavicular adenopathy present.        Left: No supraclavicular adenopathy present.   Neurological:   Sedated,  unresponsive   Skin: Skin is warm and dry. No cyanosis. Nails show no clubbing.   Psychiatric:   Unable to assess       Central Lines/PICC: present    Data Review-   Labs: I personally reviewed the latest laboratory results.  Lab Results (last 24 hours)     Procedure Component Value Units Date/Time    Blood Gas, Arterial [[id number]]  (Abnormal) Collected:  07/06/17 0621    Specimen:  Arterial Blood Updated:  07/06/17 0640     Site --      Not performed at this site.        Bennett's Test --      Not performed at this site.        pH, Arterial 7.253 (L) pH units      pCO2, Arterial 56.0 (H) mm Hg      pO2, Arterial 101.0 mm Hg      HCO3, Arterial 24.2 mmol/L      Base Excess, Arterial -3.7 (L) mmol/L      O2 Saturation, Arterial 96.6 %      Hemoglobin, Blood Gas 13.7 g/dL      Hematocrit, Blood Gas 40.0 %      CO2 Content 25.9     Sodium, Arterial 130.8 (L) mmol/L      Potassium, Arterial 4.26 mmol/L      Glucose, Arterial 567 mmol/L      Barometric Pressure for Blood Gas -- mmHg       Not performed at this site.        Modality --      Not performed at this site.        Ionized Calcium 4.7 mg/dL     Comprehensive Metabolic Panel [[id number]]  (Abnormal) Collected:  07/06/17 0620    Specimen:  Blood Updated:  07/06/17 0648     Glucose 623 (C) mg/dL      BUN 30 (H) mg/dL      Creatinine 1.78 (H) mg/dL      Sodium 130 (L) mmol/L      Potassium 4.1 mmol/L      Chloride 94 (L) mmol/L      CO2 20.0 (L) mmol/L      Calcium 9.0 mg/dL      Total Protein 7.5 g/dL      Albumin 3.40 g/dL      ALT (SGPT) 90 (H) U/L      AST (SGOT) 136 (H) U/L      Alkaline Phosphatase 265 (H) U/L      Total Bilirubin 2.4 (H) mg/dL      eGFR Non African Amer 31 (L) mL/min/1.73      Globulin 4.1 (H) gm/dL      A/G Ratio 0.8 (L) g/dL      BUN/Creatinine Ratio 16.9     Anion Gap 16.0 (H) mmol/L     BNP [[id number]]  (Abnormal) Collected:  07/06/17 0620    Specimen:  Blood Updated:  07/06/17 0655     proBNP 1050.0 (H) pg/mL     Troponin [[id number]]   (Normal) Collected:  07/06/17 0620    Specimen:  Blood Updated:  07/06/17 0655     Troponin I <0.012 ng/mL     Blood Culture [[id number]] Collected:  07/06/17 0701    Specimen:  Blood from Arm, Left Updated:  07/06/17 0720    CBC Auto Differential [[id number]]  (Abnormal) Collected:  07/06/17 0701    Specimen:  Blood Updated:  07/06/17 0724     WBC 4.25 10*3/mm3      RBC 4.40 10*6/mm3      Hemoglobin 13.4 g/dL      Hematocrit 38.7 %      MCV 88.0 fL      MCH 30.5 pg      MCHC 34.6 g/dL      RDW 14.4 %      RDW-SD 46.6 (H) fl      MPV -- fL       Instrument unable to calculate result        Platelets 164 10*3/mm3      Neutrophil % 84.5 (H) %      Lymphocyte % 12.0 %      Monocyte % 2.1 %      Eosinophil % 0.2 %      Basophil % 0.5 %      Immature Grans % 0.7 (H) %      Neutrophils, Absolute 3.59 10*3/mm3      Lymphocytes, Absolute 0.51 (L) 10*3/mm3      Monocytes, Absolute 0.09 10*3/mm3      Eosinophils, Absolute 0.01 10*3/mm3      Basophils, Absolute 0.02 10*3/mm3      Immature Grans, Absolute 0.03 (H) 10*3/mm3      nRBC 0.0 /100 WBC     Lactic Acid, Plasma [[id number]]  (Abnormal) Collected:  07/06/17 0701    Specimen:  Blood Updated:  07/06/17 0744     Lactate 3.0 (C) mmol/L     Green Top (Gel) [[id number]] Collected:  07/06/17 0620    Specimen:  Blood Updated:  07/06/17 0801     Extra Tube Hold for add-ons.      Auto resulted.       CBC & Differential [[id number]] Collected:  07/06/17 0701    Specimen:  Blood Updated:  07/06/17 0819    Narrative:       The following orders were created for panel order CBC & Differential.  Procedure                               Abnormality         Status                     ---------                               -----------         ------                     Scan Slide[[id number]]                                       Final result               CBC Auto Differential[[id number]]        Abnormal            Final result                 Please view results for these tests on the individual  orders.    Scan Slide [[id number]] Collected:  07/06/17 0701    Specimen:  Blood Updated:  07/06/17 0819     RBC Morphology Normal     WBC Morphology Normal     Platelet Estimate Adequate    Blood Gas, Arterial [[id number]]  (Abnormal) Collected:  07/06/17 0835    Specimen:  Arterial Blood Updated:  07/06/17 0847     Site --      Not performed at this site.        Bennett's Test --      Not performed at this site.        pH, Arterial 7.237 (L) pH units      pCO2, Arterial 52.3 (H) mm Hg      pO2, Arterial 84.3 mm Hg      HCO3, Arterial 21.8 (L) mmol/L      Base Excess, Arterial -6.1 (L) mmol/L      O2 Saturation, Arterial 94.5 %      Hemoglobin, Blood Gas 14.5 g/dL      Hematocrit, Blood Gas 43.0 %      CO2 Content 23.4     Sodium, Arterial 130.5 (L) mmol/L      Potassium, Arterial 3.83 mmol/L      Glucose, Arterial 583 mmol/L      Barometric Pressure for Blood Gas -- mmHg       Not performed at this site.        Modality --      Not performed at this site.        Ionized Calcium 4.6 mg/dL     Gillett Draw [[id number]] Collected:  07/06/17 0620    Specimen:  Blood Updated:  07/06/17 0901    Narrative:       The following orders were created for panel order Gillett Draw.  Procedure                               Abnormality         Status                     ---------                               -----------         ------                     Light Blue Top[[id number]]                                   Final result               Green Top (Gel)[[id number]]                                  Final result               Lavender Top[[id number]]                                     Final result               Gold Top - SST[[id number]]                                   Final result                 Please view results for these tests on the individual orders.    Light Blue Top [[id number]] Collected:  07/06/17 0701    Specimen:  Blood Updated:  07/06/17 0901     Extra Tube hold for add-on      Auto resulted       Lavender Top  [[id number]] Collected:  07/06/17 0701    Specimen:  Blood Updated:  07/06/17 0901     Extra Tube hold for add-on      Auto resulted       Gold Top - SST [[id number]] Collected:  07/06/17 0701    Specimen:  Blood Updated:  07/06/17 0901     Extra Tube Hold for add-ons.      Auto resulted.       Blood Culture [[id number]] Collected:  07/06/17 0906    Specimen:  Blood from Hand, Right Updated:  07/06/17 0906    POC Glucose Fingerstick [[id number]]  (Abnormal) Collected:  07/06/17 1215    Specimen:  Blood Updated:  07/06/17 1215     Glucose 543 (A) mg/dL     Blood Gas, Arterial [[id number]]  (Abnormal) Collected:  07/06/17 1213    Specimen:  Arterial Blood from Arm, Right Updated:  07/06/17 1234     Site --      Not performed at this site.        Bennett's Test --      Not performed at this site.        pH, Arterial 7.193 (L) pH units      pCO2, Arterial 61.5 (H) mm Hg      pO2, Arterial 47.9 (L) mm Hg      HCO3, Arterial 23.1 mmol/L      Base Excess, Arterial -5.9 (L) mmol/L      O2 Saturation, Arterial 77.1 (L) %      Hemoglobin, Blood Gas 13.9 g/dL      Hematocrit, Blood Gas 41.0 %      CO2 Content 25.0     Sodium, Arterial 131.6 (L) mmol/L      Potassium, Arterial 4.46 mmol/L      Glucose, Arterial 582 mmol/L      Barometric Pressure for Blood Gas -- mmHg       Not performed at this site.        Modality --      Not performed at this site.        Ionized Calcium 4.6 mg/dL     Lavender Top [[id number]] Collected:  07/06/17 1259    Specimen:  Blood Updated:  07/06/17 1401     Extra Tube hold for add-on      Auto resulted       Gold Top - SST [[id number]] Collected:  07/06/17 1258    Specimen:  Blood Updated:  07/06/17 1401     Extra Tube Hold for add-ons.      Auto resulted.       Green Top (Gel) [[id number]] Collected:  07/06/17 1259    Specimen:  Blood Updated:  07/06/17 1401     Extra Tube Hold for add-ons.      Auto resulted.       Basic Metabolic Panel [[id number]]  (Abnormal) Collected:  07/06/17 1311    Specimen:  Blood  Updated:  07/06/17 1411     Glucose 588 (C) mg/dL      BUN 33 (H) mg/dL      Creatinine 1.84 (H) mg/dL      Sodium 132 (L) mmol/L      Potassium 4.6 mmol/L      Chloride 94 (L) mmol/L      CO2 21.0 (L) mmol/L      Calcium 8.1 (L) mg/dL      eGFR Non African Amer 30 (L) mL/min/1.73      BUN/Creatinine Ratio 17.9     Anion Gap 17.0 (H) mmol/L     Lactate Acid, Reflex [[id number]]  (Abnormal) Collected:  07/06/17 1311    Specimen:  Blood Updated:  07/06/17 1413     Lactate 2.4 (C) mmol/L     POC Glucose Fingerstick [[id number]]  (Abnormal) Collected:  07/06/17 1417    Specimen:  Blood Updated:  07/06/17 1417     Glucose 521 (A) mg/dL     Extra Tubes [[id number]] Collected:  07/06/17 1316    Specimen:  Blood from Blood, Venous Line Updated:  07/06/17 1501    Narrative:       The following orders were created for panel order Extra Tubes.  Procedure                               Abnormality         Status                     ---------                               -----------         ------                     Lavender Top[[id number]]                                     Final result               Gold Top - SST[[id number]]                                   Final result                 Please view results for these tests on the individual orders.    Lavender Top [[id number]] Collected:  07/06/17 1316    Specimen:  Blood Updated:  07/06/17 1501     Extra Tube hold for add-on      Auto resulted       Gold Top - SST [[id number]] Collected:  07/06/17 1316    Specimen:  Blood Updated:  07/06/17 1501     Extra Tube Hold for add-ons.      Auto resulted.       Extra Tubes [[id number]] Collected:  07/06/17 1258    Specimen:  Blood from Blood, Venous Line Updated:  07/06/17 1538    Narrative:       The following orders were created for panel order Extra Tubes.  Procedure                               Abnormality         Status                     ---------                               -----------         ------                     Light  Blue Top[[id number]]                                                              Lavender Top[[id number]]                                     Final result               Gold Top - SST[[id number]]                                   Final result               Green Top (Gel)[[id number]]                                  Final result                 Please view results for these tests on the individual orders.    Glucose, Random [[id number]] Collected:  07/06/17 1558    Specimen:  Blood Updated:  07/06/17 1600           Imaging: I personally visualized the relevant images of scans/x-rays performed.  Imaging Results (last 24 hours)     Procedure Component Value Units Date/Time    XR Chest 1 View [[id number]] Collected:  07/06/17 0624     Updated:  07/06/17 0647    Narrative:         Chest single view on  7/6/2017     CLINICAL INDICATION: Shortness of breath    COMPARISON: 6/12/2017    FINDINGS: There has been development of extensive right greater  than left airspace opacities with consolidation in the right lung  consistent with bilateral pneumonia. Heart is upper limits normal  for size. No bony abnormality is noted.      Impression:       Development of extensive bilateral pneumonia.    Electronically signed by:  Jorge Leary  7/6/2017 6:45 AM CDT  Workstation: [id number]    XR Chest Post CVA Port [[id number]] Collected:  07/06/17 1305     Updated:  07/06/17 1329    Narrative:       Chest single view.        CLINICAL INDICATION: Shortness of breath. PICC line placement.    COMPARISON: Chest July 6, 2017 at 6:05 AM.    FINDINGS: Dense consolidation of the right upper lobe. Diffuse  infiltrative changes involving most of the left lung. No changes  since prior examination allowing for differences in technique.      Impression:       CONCLUSION: Right arm PICC line catheter with catheter tip in  medial right apex i.e. the junction of subclavian vein and  superior vena cava in satisfactory position.    Electronically  signed by:  Anselmo Frey MD  7/6/2017 1:27 PM CDT  Workstation: [id number]    IR PICC wo fluoro guidance [[id number]] Resulted:  07/06/17 1402     Updated:  07/06/17 1402    Narrative:       This procedure was auto-finalized with no dictation required.    US Guided Vascular Access [[id number]] Resulted:  07/06/17 1418     Updated:  07/06/17 1418    Narrative:       This procedure was auto-finalized with no dictation required.    XR Chest 1 View [[id number]] Collected:  07/06/17 1449     Updated:  07/06/17 1527    Narrative:       [facility] [place]    Patient Name: CAROLE JAVIER    ORDERING: YONY MOORE     ATTENDING:    REFERRING: YONY MOORE    -----------------------    PROCEDURE: Portable chest x-ray    TECHNIQUE: Single AP view of the chest    COMPARISON: July 6, 2017    HISTORY: Intubated patient    FINDINGS:     Life-support devices: Right upper extremity PICC terminates in  the right subclavian region. Endotracheal tube terminates  approximately 3.9 cm above the brit. Enteric tube extends below  the margin of the film but appears to be below the diaphragm.    Lungs/pleura: Bilateral diffuse consolidation concerning for  pneumonia and/or severe pulmonary edema, right worse than left.  Left-sided opacities have increased compared to the prior study.    Heart, hilar and mediastinal structures:  Normal accounting for  projection and technique      Impression:       CONCLUSION:  Right upper extremity PICC terminates in the right subclavian  region. Endotracheal tube terminates approximately 3.9 cm above  the brit. Enteric tube extends below the margin of the film but  appears to be below the diaphragm.  Bilateral diffuse consolidation concerning for pneumonia and/or  severe pulmonary edema, right worse than left. Left-sided  opacities have increased compared to the prior study.    Electronically signed by:  Adam Hatch MD  7/6/2017 3:25 PM CDT  Workstation: [id number]             Assessment/Plan     Assessment:  # Acute hypoxemic and hypercarbic respiratory failure  # ARDS  # Probable cor pulmonale with hypervolemia  # CKD  # Hyperglycemia/ poorly controlled DM  # Metabolic and respiratory acidosis  # Tobacco use      Recommendations:  -AC/VC per ARDSnet protocol. Vt increased to 330, PEEP 18.  -Add versed gtt on top of propofol. Sedate heavily to allow ventilator to take over. If sats persistently <88%/ PaO2<55, recommend paralysis for refractory ARDS.  -Empiric zosyn/ levaquin/ vanc  -Check sputum culture, strep pneumo and legionella urine antigens  -Solumedrol 60mg q8hrs  -Levo if needed to keep SBP >90  -Hold IVFs as not to worsen volume overload. Would benefit from diuresis but can not tolerate at this time.  -F/u TTE  -PPX: Pepcid, Heparin TID    Prognosis guarded/ poor. Recommend contacting next of kin to make aware of critical illness and high mortality risk.      Critical care time spent: 76 minutes  This time excludes other billable procedures. Time does include preparation of documents, medical consultations, review of old records, and direct bedside care.       Thank you for allowing me to participate in the care of Lili Méndez. Please do not hesitate to contact me with any questions.       This document has been electronically signed by Thao Broussard MD on July 6, 2017 4:06 PM      [phone]    Dictated using Dragon

## 2017-07-06 NOTE — ED NOTES
Dr. Lew notified about pts non compliance with non re breather. Dr. Lew stated that he would put order in for Ativan.      Roxanne Baldwin RN  07/06/17 0907

## 2017-07-06 NOTE — ED NOTES
Pt refusing bi-pap or any further treatments.  Dr. Louann vitale.      Roxanne Baldwin, RN  07/06/17 9643

## 2017-07-06 NOTE — PROGRESS NOTES
TWO PATIENT IDENTIFIERS WERE USED. CONSENT WAS SIGNED PER PATIENT EDUCATION MATERIAL WAS GIVEN TO PATIENT AND / OR FAMILY. THE PATIENT WAS DRAPED WITH FULL BODY DRAPE AND PATIENT'SRIGHT   ARM WAS PREPPED WITH CHLORAPREP.  ULTRASOUND WAS USED TO LOCALIZE THERIGHT BASILIC VEIN. SUBCUTANEOUS TISSUE AT THE CATHETER SITE WAS INFILTRATED WITH 2% LIDOCAINE. UNDER ULTRASOUND GUIDANCE, THE VEIN WAS ACCESSED WITH A 21GAUGE  NEEDLE. AN 0.018 WIRE WAS THEN THREADED THROUGH THE NEEDLE INTO THE CENTRAL VENOUS SYSTEM. THE 21GAUGE  NEEDLE WAS REMOVED AND A 6 FRENCHPEEL AWAY SHEATH WAS PLACED OVER THE WIRE. THE PICC LINE CATHETER WAS CUT AT 38 CM. THE PICC LINE CATHETER WAS THEN PLACED OVER THE WIRE INTO THE VEIN, THE SHEATH WAS PEELED AWAY,WIRE WAS REMOVED. CATHETER WAS FLUSHED WITH NORMAL SALINE AND TIPS APPLIED. BIOPATCH PLACED. CATHETER SECURED WITHSTATLOCK  AND TEGADERM. PATIENT TOLERATED PROCEDURE WELL. THIS WAS DONE IN   ER      IMPRESSION: SUCCESSFUL PLACEMENT OF TRIPLE LUMEN PICC      Isadora Cho  7/6/2017  2:00 PM

## 2017-07-06 NOTE — H&P
History and Physical  William Lew MD  Hospitalist      Patient Care Team:  No Known Provider as PCP - General    Chief complaint   Chief Complaint   Patient presents with   • Respiratory Distress       Subjective     Patient is a 42 y.o. female admitted for worsening shortness of breath, cough, weakness, lower extremity edema. Her symptoms are worsening for the last several days. She thinks she might have gained several pounds in the meantime. Her overall effort capacity is extremely limited due to her morbid obesity.     She was asked on several occasions about the need for mechanical ventilation and her response has been a consistent 'No' each end every time. Even earlier today, at around 8:45 AM, first thing upon admission, when her blood gas was at what appears to be at the baseline (a pH 7.235, pCO2 52.3, pO2 of 101 and a HCO3 24.2), her answer was still 'No'.    She refuses to wear the BiPAP mask, the non rebreather mask as well. She will allow only the nasal cannula. She consented to PICC line placement though. I understand that she has a BiPAP machine at home which she refuses to wear due to the discomfort.    After all the talking, pleading, coaxing and convincing done by the ER staff, nurses, nursing clinical leader, , she finally consented to intubation / mechanical ventilation. There is no POA of record and no family members around to discuss her case with.     Of note is that her glucose values are out of control as well - an admission value of 630 mg/dL    History  Past Medical History:   Diagnosis Date   • Diabetes mellitus    • Hyperlipidemia    • Hypertension    • Migraine      Past Surgical History:   Procedure Laterality Date   •  SECTION       Family History   Problem Relation Age of Onset   • Hypertension Mother      Social History   Substance Use Topics   • Smoking status: Current Every Day Smoker     Packs/day: 1.00     Types: Cigarettes   • Smokeless tobacco: None       Comment: 1 pack per week   • Alcohol use No     Prescriptions Prior to Admission   Medication Sig Dispense Refill Last Dose   • atorvastatin (LIPITOR) 20 MG tablet Take 20 mg by mouth Daily.      • fluconazole (DIFLUCAN) 150 MG tablet Take it after completion of course of antibiotics 1 tablet 0    • gabapentin (NEURONTIN) 600 MG tablet Take 600 mg by mouth 3 (Three) Times a Day.      • HYDROcodone-acetaminophen (NORCO) 7.5-325 MG per tablet Take 1 tablet by mouth Every 6 (Six) Hours As Needed for Moderate Pain (4-6). 15 tablet 0    • insulin detemir (LEVEMIR) 100 UNIT/ML injection Inject 55 Units under the skin Every Night.      • levothyroxine (SYNTHROID, LEVOTHROID) 300 MCG tablet Take 300 mcg by mouth Daily.      • losartan (COZAAR) 25 MG tablet Take 25 mg by mouth Daily.      • Multiple Vitamins-Minerals (MULTIVITAMIN WITH MINERALS) tablet tablet Take 1 tablet by mouth Daily.      • naproxen (NAPROSYN) 500 MG tablet Take 1 tablet by mouth 2 (Two) Times a Day With Meals. 10 tablet 0    • phenazopyridine (PYRIDIUM) 100 MG tablet Take 1 tablet by mouth 3 (Three) Times a Day As Needed for bladder spasms. 20 tablet 0    • QUEtiapine XR (SEROquel XR) 300 MG 24 hr tablet Take 300 mg by mouth 2 (Two) Times a Day.      • sulfamethoxazole-trimethoprim (BACTRIM DS,SEPTRA DS) 800-160 MG per tablet Take 1 tablet by mouth 2 (Two) Times a Day. 20 tablet 0      Allergies:  Erythromycin; Amoxicillin; Codeine; and Bupropion    Review of Systems  Review of Systems   Constitutional: Positive for fatigue. Negative for fever.   HENT: Positive for congestion.    Respiratory: Positive for cough, choking and wheezing.    Cardiovascular: Negative for chest pain, palpitations and leg swelling.   Gastrointestinal: Positive for abdominal distention. Negative for abdominal pain, anal bleeding, constipation, nausea and vomiting.   Musculoskeletal: Positive for back pain and neck pain.   Neurological: Positive for weakness and  light-headedness.   Psychiatric/Behavioral: Negative for agitation, behavioral problems and confusion.   All other systems reviewed and are negative.      Objective     Vital Signs  Temp:  [98 °F (36.7 °C)] 98 °F (36.7 °C)  Heart Rate:  [] 106  Resp:  [22-30] 28  BP: ()/(42-72) 141/72  FiO2 (%):  [100 %] 100 %    Physical Exam:  Physical Exam   Constitutional: She is oriented to person, place, and time.   Extreme obesity   HENT:   Head: Normocephalic and atraumatic.   Mouth/Throat: No oropharyngeal exudate.   Eyes: EOM are normal. Pupils are equal, round, and reactive to light. No scleral icterus.   Neck: Normal range of motion. Neck supple.   Cardiovascular: Normal rate and regular rhythm.    No murmur heard.  Pulmonary/Chest: She is in respiratory distress. She has rales. She exhibits no tenderness.   Abdominal: Soft. She exhibits distension. There is no tenderness. There is no rebound and no guarding.   Musculoskeletal: She exhibits edema (mild bilateral). She exhibits no tenderness.   Neurological: She is alert and oriented to person, place, and time. No cranial nerve deficit.   Skin: Skin is warm and dry. There is pallor.   Psychiatric: She has a normal mood and affect. Her behavior is normal.       Results Review:   Lab Results (last 24 hours)     Procedure Component Value Units Date/Time    Blood Gas, Arterial [[id number]]  (Abnormal) Collected:  07/06/17 0621    Specimen:  Arterial Blood Updated:  07/06/17 0640     Site --      Not performed at this site.        Bennett's Test --      Not performed at this site.        pH, Arterial 7.253 (L) pH units      pCO2, Arterial 56.0 (H) mm Hg      pO2, Arterial 101.0 mm Hg      HCO3, Arterial 24.2 mmol/L      Base Excess, Arterial -3.7 (L) mmol/L      O2 Saturation, Arterial 96.6 %      Hemoglobin, Blood Gas 13.7 g/dL      Hematocrit, Blood Gas 40.0 %      CO2 Content 25.9     Sodium, Arterial 130.8 (L) mmol/L      Potassium, Arterial 4.26 mmol/L       Glucose, Arterial 567 mmol/L      Barometric Pressure for Blood Gas -- mmHg       Not performed at this site.        Modality --      Not performed at this site.        Ionized Calcium 4.7 mg/dL     Comprehensive Metabolic Panel [[id number]]  (Abnormal) Collected:  07/06/17 0620    Specimen:  Blood Updated:  07/06/17 0648     Glucose 623 (C) mg/dL      BUN 30 (H) mg/dL      Creatinine 1.78 (H) mg/dL      Sodium 130 (L) mmol/L      Potassium 4.1 mmol/L      Chloride 94 (L) mmol/L      CO2 20.0 (L) mmol/L      Calcium 9.0 mg/dL      Total Protein 7.5 g/dL      Albumin 3.40 g/dL      ALT (SGPT) 90 (H) U/L      AST (SGOT) 136 (H) U/L      Alkaline Phosphatase 265 (H) U/L      Total Bilirubin 2.4 (H) mg/dL      eGFR Non African Amer 31 (L) mL/min/1.73      Globulin 4.1 (H) gm/dL      A/G Ratio 0.8 (L) g/dL      BUN/Creatinine Ratio 16.9     Anion Gap 16.0 (H) mmol/L     BNP [[id number]]  (Abnormal) Collected:  07/06/17 0620    Specimen:  Blood Updated:  07/06/17 0655     proBNP 1050.0 (H) pg/mL     Troponin [[id number]]  (Normal) Collected:  07/06/17 0620    Specimen:  Blood Updated:  07/06/17 0655     Troponin I <0.012 ng/mL     Blood Culture [[id number]] Collected:  07/06/17 0701    Specimen:  Blood from Arm, Left Updated:  07/06/17 0720    CBC Auto Differential [[id number]]  (Abnormal) Collected:  07/06/17 0701    Specimen:  Blood Updated:  07/06/17 0724     WBC 4.25 10*3/mm3      RBC 4.40 10*6/mm3      Hemoglobin 13.4 g/dL      Hematocrit 38.7 %      MCV 88.0 fL      MCH 30.5 pg      MCHC 34.6 g/dL      RDW 14.4 %      RDW-SD 46.6 (H) fl      MPV -- fL       Instrument unable to calculate result        Platelets 164 10*3/mm3      Neutrophil % 84.5 (H) %      Lymphocyte % 12.0 %      Monocyte % 2.1 %      Eosinophil % 0.2 %      Basophil % 0.5 %      Immature Grans % 0.7 (H) %      Neutrophils, Absolute 3.59 10*3/mm3      Lymphocytes, Absolute 0.51 (L) 10*3/mm3      Monocytes, Absolute 0.09 10*3/mm3      Eosinophils,  Absolute 0.01 10*3/mm3      Basophils, Absolute 0.02 10*3/mm3      Immature Grans, Absolute 0.03 (H) 10*3/mm3      nRBC 0.0 /100 WBC     Lactic Acid, Plasma [[id number]]  (Abnormal) Collected:  07/06/17 0701    Specimen:  Blood Updated:  07/06/17 0744     Lactate 3.0 (C) mmol/L     Green Top (Gel) [[id number]] Collected:  07/06/17 0620    Specimen:  Blood Updated:  07/06/17 0801     Extra Tube Hold for add-ons.      Auto resulted.       CBC & Differential [[id number]] Collected:  07/06/17 0701    Specimen:  Blood Updated:  07/06/17 0819    Narrative:       The following orders were created for panel order CBC & Differential.  Procedure                               Abnormality         Status                     ---------                               -----------         ------                     Scan Slide[[id number]]                                       Final result               CBC Auto Differential[[id number]]        Abnormal            Final result                 Please view results for these tests on the individual orders.    Scan Slide [[id number]] Collected:  07/06/17 0701    Specimen:  Blood Updated:  07/06/17 0819     RBC Morphology Normal     WBC Morphology Normal     Platelet Estimate Adequate    Blood Gas, Arterial [[id number]]  (Abnormal) Collected:  07/06/17 0835    Specimen:  Arterial Blood Updated:  07/06/17 0847     Site --      Not performed at this site.        Bennett's Test --      Not performed at this site.        pH, Arterial 7.237 (L) pH units      pCO2, Arterial 52.3 (H) mm Hg      pO2, Arterial 84.3 mm Hg      HCO3, Arterial 21.8 (L) mmol/L      Base Excess, Arterial -6.1 (L) mmol/L      O2 Saturation, Arterial 94.5 %      Hemoglobin, Blood Gas 14.5 g/dL      Hematocrit, Blood Gas 43.0 %      CO2 Content 23.4     Sodium, Arterial 130.5 (L) mmol/L      Potassium, Arterial 3.83 mmol/L      Glucose, Arterial 583 mmol/L      Barometric Pressure for Blood Gas -- mmHg       Not performed at this  site.        Modality --      Not performed at this site.        Ionized Calcium 4.6 mg/dL     [place] Draw [[id number]] Collected:  07/06/17 0620    Specimen:  Blood Updated:  07/06/17 0901    Narrative:       The following orders were created for panel order [place] Draw.  Procedure                               Abnormality         Status                     ---------                               -----------         ------                     Light Blue Top[[id number]]                                   Final result               Green Top (Gel)[[id number]]                                  Final result               Lavender Top[[id number]]                                     Final result               Gold Top - SST[[id number]]                                   Final result                 Please view results for these tests on the individual orders.    Light Blue Top [[id number]] Collected:  07/06/17 0701    Specimen:  Blood Updated:  07/06/17 0901     Extra Tube hold for add-on      Auto resulted       Lavender Top [[id number]] Collected:  07/06/17 0701    Specimen:  Blood Updated:  07/06/17 0901     Extra Tube hold for add-on      Auto resulted       Gold Top - SST [[id number]] Collected:  07/06/17 0701    Specimen:  Blood Updated:  07/06/17 0901     Extra Tube Hold for add-ons.      Auto resulted.       Blood Culture [[id number]] Collected:  07/06/17 0906    Specimen:  Blood from Hand, Right Updated:  07/06/17 0906    POC Glucose Fingerstick [[id number]]  (Abnormal) Collected:  07/06/17 1215    Specimen:  Blood Updated:  07/06/17 1215     Glucose 543 (A) mg/dL     Blood Gas, Arterial [[id number]]  (Abnormal) Collected:  07/06/17 1213    Specimen:  Arterial Blood from Arm, Right Updated:  07/06/17 1234     Site --      Not performed at this site.        Bennett's Test --      Not performed at this site.        pH, Arterial 7.193 (L) pH units      pCO2, Arterial 61.5 (H) mm Hg      pO2, Arterial 47.9 (L) mm Hg      HCO3,  Arterial 23.1 mmol/L      Base Excess, Arterial -5.9 (L) mmol/L      O2 Saturation, Arterial 77.1 (L) %      Hemoglobin, Blood Gas 13.9 g/dL      Hematocrit, Blood Gas 41.0 %      CO2 Content 25.0     Sodium, Arterial 131.6 (L) mmol/L      Potassium, Arterial 4.46 mmol/L      Glucose, Arterial 582 mmol/L      Barometric Pressure for Blood Gas -- mmHg       Not performed at this site.        Modality --      Not performed at this site.        Ionized Calcium 4.6 mg/dL     Lavender Top [[id number]] Collected:  07/06/17 1259    Specimen:  Blood Updated:  07/06/17 1401     Extra Tube hold for add-on      Auto resulted       Gold Top - SST [[id number]] Collected:  07/06/17 1258    Specimen:  Blood Updated:  07/06/17 1401     Extra Tube Hold for add-ons.      Auto resulted.       Green Top (Gel) [[id number]] Collected:  07/06/17 1259    Specimen:  Blood Updated:  07/06/17 1401     Extra Tube Hold for add-ons.      Auto resulted.       Basic Metabolic Panel [[id number]]  (Abnormal) Collected:  07/06/17 1311    Specimen:  Blood Updated:  07/06/17 1411     Glucose 588 (C) mg/dL      BUN 33 (H) mg/dL      Creatinine 1.84 (H) mg/dL      Sodium 132 (L) mmol/L      Potassium 4.6 mmol/L      Chloride 94 (L) mmol/L      CO2 21.0 (L) mmol/L      Calcium 8.1 (L) mg/dL      eGFR Non African Amer 30 (L) mL/min/1.73      BUN/Creatinine Ratio 17.9     Anion Gap 17.0 (H) mmol/L     Lactate Acid, Reflex [[id number]]  (Abnormal) Collected:  07/06/17 1311    Specimen:  Blood Updated:  07/06/17 1413     Lactate 2.4 (C) mmol/L     POC Glucose Fingerstick [[id number]]  (Abnormal) Collected:  07/06/17 1417    Specimen:  Blood Updated:  07/06/17 1417     Glucose 521 (A) mg/dL     Extra Tubes [[id number]] Collected:  07/06/17 1316    Specimen:  Blood from Blood, Venous Line Updated:  07/06/17 1501    Narrative:       The following orders were created for panel order Extra Tubes.  Procedure                               Abnormality         Status                      ---------                               -----------         ------                     Lavender Top[[id number]]                                     Final result               Gold Top - SST[[id number]]                                   Final result                 Please view results for these tests on the individual orders.    Lavender Top [[id number]] Collected:  07/06/17 1316    Specimen:  Blood Updated:  07/06/17 1501     Extra Tube hold for add-on      Auto resulted       Gold Top - SST [[id number]] Collected:  07/06/17 1316    Specimen:  Blood Updated:  07/06/17 1501     Extra Tube Hold for add-ons.      Auto resulted.       Extra Tubes [[id number]] Collected:  07/06/17 1258    Specimen:  Blood from Blood, Venous Line Updated:  07/06/17 1538    Narrative:       The following orders were created for panel order Extra Tubes.  Procedure                               Abnormality         Status                     ---------                               -----------         ------                     Light Blue Top[[id number]]                                                              Lavender Top[[id number]]                                     Final result               Gold Top - SST[[id number]]                                   Final result               Green Top (Gel)[[id number]]                                  Final result                 Please view results for these tests on the individual orders.    Urinalysis With / Culture If Indicated [[id number]] Collected:  07/06/17 1619    Specimen:  Urine from Urine, Catheter Updated:  07/06/17 1624    Glucose, Random [[id number]]  (Abnormal) Collected:  07/06/17 1558    Specimen:  Blood Updated:  07/06/17 1630     Glucose 525 (C) mg/dL     S. Pneumo Ag Urine or CSF [[id number]]  (Normal) Collected:  07/06/17 1619    Specimen:  Urine from Urine, Catheter Updated:  07/06/17 1736     Strep Pneumo Ag Negative    Legionella Antigen, Urine [[id number]]  (Abnormal)  Collected:  07/06/17 1619    Specimen:  Urine from Urine, Catheter Updated:  07/06/17 1819     LEGIONELLA ANTIGEN, URINE Positive (A)    Narrative:       RESULT CONFIRMED BY REPEAT ANALYSIS          Imaging Results (last 24 hours)     Procedure Component Value Units Date/Time    XR Chest 1 View [[id number]] Collected:  07/06/17 0624     Updated:  07/06/17 0647    Narrative:         Chest single view on  7/6/2017     CLINICAL INDICATION: Shortness of breath    COMPARISON: 6/12/2017    FINDINGS: There has been development of extensive right greater  than left airspace opacities with consolidation in the right lung  consistent with bilateral pneumonia. Heart is upper limits normal  for size. No bony abnormality is noted.      Impression:       Development of extensive bilateral pneumonia.    Electronically signed by:  Jorge Leary  7/6/2017 6:45 AM CDT  Workstation: [id number]    XR Chest Post CVA Port [[id number]] Collected:  07/06/17 1305     Updated:  07/06/17 1329    Narrative:       Chest single view.        CLINICAL INDICATION: Shortness of breath. PICC line placement.    COMPARISON: Chest July 6, 2017 at 6:05 AM.    FINDINGS: Dense consolidation of the right upper lobe. Diffuse  infiltrative changes involving most of the left lung. No changes  since prior examination allowing for differences in technique.      Impression:       CONCLUSION: Right arm PICC line catheter with catheter tip in  medial right apex i.e. the junction of subclavian vein and  superior vena cava in satisfactory position.    Electronically signed by:  Anselmo Frey MD  7/6/2017 1:27 PM CDT  Workstation: [id number]    IR PICC wo fluoro guidance [[id number]] Resulted:  07/06/17 1402     Updated:  07/06/17 1402    Narrative:       This procedure was auto-finalized with no dictation required.    US Guided Vascular Access [[id number]] Resulted:  07/06/17 1418     Updated:  07/06/17 1418    Narrative:       This procedure was auto-finalized  with no dictation required.    XR Chest 1 View [[id number]] Collected:  07/06/17 1449     Updated:  07/06/17 1527    Narrative:       [facility] [place]    Patient Name: CAROLE JAVIER    ORDERING: YONY MOORE     ATTENDING:    REFERRING: YONY MOORE    -----------------------    PROCEDURE: Portable chest x-ray    TECHNIQUE: Single AP view of the chest    COMPARISON: July 6, 2017    HISTORY: Intubated patient    FINDINGS:     Life-support devices: Right upper extremity PICC terminates in  the right subclavian region. Endotracheal tube terminates  approximately 3.9 cm above the brit. Enteric tube extends below  the margin of the film but appears to be below the diaphragm.    Lungs/pleura: Bilateral diffuse consolidation concerning for  pneumonia and/or severe pulmonary edema, right worse than left.  Left-sided opacities have increased compared to the prior study.    Heart, hilar and mediastinal structures:  Normal accounting for  projection and technique      Impression:       CONCLUSION:  Right upper extremity PICC terminates in the right subclavian  region. Endotracheal tube terminates approximately 3.9 cm above  the brit. Enteric tube extends below the margin of the film but  appears to be below the diaphragm.  Bilateral diffuse consolidation concerning for pneumonia and/or  severe pulmonary edema, right worse than left. Left-sided  opacities have increased compared to the prior study.    Electronically signed by:  Adam Hatch MD  7/6/2017 3:25 PM CDT  Workstation: [id number]          Assessment/Plan     Principal Problem:    Acute respiratory failure with hypoxia and hypercapnia  Active Problems:    Diabetes mellitus type 2, uncontrolled    Bilateral pneumonia    ARDS (adult respiratory distress syndrome)    Morbid obesity    Obstructive apnea    CKD (chronic kidney disease) stage 3, GFR 30-59 ml/min    Abnormal LFTs    Legionella infection    Pulmonary hypertension    Respiratory support  now that she consented to intubation and mechanical ventilation / IV insulin, continue with the IV antibiotics, follow up cultures - Legionella antigen present in her urine.    She may require paralysis in order to allow a better oxygenation.    William Lew MD  07/06/17  6:28 PM

## 2017-07-06 NOTE — SIGNIFICANT NOTE
Late entry pt was adamant that she will not be intubated. Pt is alert to time and place and is even upset about the nonrebreather

## 2017-07-06 NOTE — ED NOTES
Dr. Lew informed on patient status.   now wants pt to go to ICU.  TORB received for duo neb and to place patient on non re breather.      Roxanne Baldwin RN  07/06/17 0999

## 2017-07-06 NOTE — ED PROVIDER NOTES
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 images are obtained of the thoracic spine. Multiplanar reformations are submitted for interpretation. Dose length product is 2729.3. This exam was performed according to our departmental dose-optimization program, which includes automated exposure control, adjustment of the mA and/or kV according to patient size and/or use of iterative reconstruction technique. COMPARISON:  None. FINDINGS:  The bones are osteopenic. There are multiple Schmorl's nodes involving the endplates of the T6, T7-T8 and T9 vertebral bodies. There is multilevel spondylosis. The thoracic vertebral bodies have generally normal height and alignment. Transverse and spinous processes are intact. The visualized ribs are within normal limits. The visualized lungs appear to be clear. Paraspinal soft tissues are within normal limits.     Impression: 1.  No CT evidence of acute compression or displaced fracture of the thoracic spine. 2.  Multilevel thoracic spondylosis. Electronically signed by:  Humera Broussard MD  6/20/2017 8:15 PM CDT Workstation: [email or web address]    Us Guided Vascular Access    Result Date: 7/6/2017  Narrative: This procedure was auto-finalized with no dictation required.    Xr Chest 1 View    Result Date: 7/6/2017  Narrative: [facility] [place] Patient Name: CAROLE JAVIER  ORDERING: YONY MOORE ATTENDING: REFERRING: YONY MOORE ----------------------- PROCEDURE: Portable chest x-ray TECHNIQUE: Single AP view of the chest COMPARISON: July 6, 2017 HISTORY: Intubated patient FINDINGS:  Life-support devices: Right upper extremity PICC terminates in the right subclavian region. Endotracheal tube terminates approximately 3.9 cm above the brit. Enteric tube extends below the margin of the film but appears to be below the diaphragm. Lungs/pleura: Bilateral diffuse consolidation concerning for pneumonia and/or severe pulmonary edema, right worse than left. Left-sided opacities have increased compared to the prior study. Heart, hilar and mediastinal structures:  Normal accounting for projection and technique     Impression: CONCLUSION: Right upper extremity PICC terminates in the right subclavian region. Endotracheal tube terminates approximately 3.9 cm above the brit. Enteric tube extends below the margin of the film but appears to be below the diaphragm. Bilateral diffuse consolidation concerning for pneumonia and/or severe pulmonary edema, right worse than left. Left-sided opacities have increased compared to the prior study. Electronically signed by:  Adam Hatch MD  7/6/2017 3:25 PM CDT Workstation: [id number]    Xr Chest 1 View    Result Date: 7/6/2017  Narrative: Chest single view on  7/6/2017 CLINICAL INDICATION: Shortness of breath COMPARISON: 6/12/2017 FINDINGS: There has been development of extensive right greater than left airspace opacities with consolidation in the right lung consistent with bilateral pneumonia. Heart is upper limits normal for size. No bony abnormality is noted.     Impression: Development of extensive bilateral pneumonia. Electronically signed by:  Jorge Leary  7/6/2017 6:45 AM CDT Workstation: [id number]    Xr Chest 1 View    Result Date: 6/12/2017  Narrative: Chest single view on  6/12/2017 CLINICAL INDICATION: Seizure COMPARISON:  2/22/2017 FINDINGS: This is a lower volume inspiration film. Heart is upper limits normal for size. Lungs are clear. Hilar and mediastinal contours are within normal limits.     Impression: Lower volume inspiration film with otherwise no acute disease. Electronically signed by:  Jorge Leary  6/12/2017 6:18 AM CDT Workstation: [id number]    Xr Chest Post Cva Port    Result Date: 7/6/2017  Narrative: Chest single view. CLINICAL INDICATION: Shortness of breath. PICC line placement. COMPARISON: Chest July 6, 2017 at 6:05 AM. FINDINGS: Dense consolidation of the right upper lobe. Diffuse infiltrative changes involving most of the left lung. No changes since prior examination allowing for differences in technique.     Impression: CONCLUSION: Right arm PICC line catheter with catheter tip in medial right apex i.e. the junction of subclavian vein and superior vena cava in satisfactory position. Electronically signed by:  Anselmo Frey MD  7/6/2017 1:27 PM CDT Workstation: [id number]    Ir Picc Wo Fluoro Guidance    Result Date: 7/6/2017  Narrative: This procedure was auto-finalized with no dictation required.          MDM    Final diagnoses:   Acute respiratory failure with hypoxia and hypercapnia   Sepsis due to pneumonia   Acute renal failure, unspecified acute renal failure type   Hyperglycemia            Daniel Lunsford MD  07/07/17 2019

## 2017-07-06 NOTE — ANESTHESIA PROCEDURE NOTES
Airway  Airway not difficult    General Information and Staff    Patient location during procedure: ED  Anesthesiologist: CORINNE GONZALEZ    Indications and Patient Condition  Indications for airway management: respiratory distress/failure    Preoxygenated: yes  Mask difficulty assessment: 1 - vent by mask    Final Airway Details  Final airway type: endotracheal airway      Successful airway: ETT  Cuffed: yes   Successful intubation technique: video laryngoscopy  Endotracheal tube insertion site: oral  Blade: Ana  Blade size: #3  ETT size: 7.5 mm  Cormack-Lehane Classification: grade I - full view of glottis  Placement verified by: chest auscultation and capnometry   Measured from: teeth  ETT to teeth (cm): 22    Additional Comments  Teeth checked after intubation, no damage noted.

## 2017-07-07 PROBLEM — J96.02 ACUTE RESPIRATORY ACIDOSIS (HCC): Status: ACTIVE | Noted: 2017-01-01

## 2017-07-07 PROBLEM — E87.20 METABOLIC ACIDOSIS: Chronic | Status: ACTIVE | Noted: 2017-01-01

## 2017-07-07 NOTE — PROGRESS NOTES
CRITICAL CARE PROGRESS NOTE  Thao Broussard MD    [facility] CRITICAL CARE  7/7/2017        Lili Méndez  [id number]  1975  42 y.o. female            LOS: 1 day   Shant Shin MD    Chief Complaint/Reason for visit: F/u respiratory failure    Subjective     Interval History:   History taken from: patient/ chart    Required paralysis overnight. RR not adjusted once paralyzed to stabilize MV. ABG this AM with severe respiratory acidosis. RR increased to 24 and repeat ABG pending. Sats in mid 80's at best. PaO2 54. UOP dropped and hypotensive this AM.     Review of Systems:   Review of Systems   Unable to perform ROS: Intubated     All systems were reviewed and negative except as noted above in the HPI.    Medical history, surgical history, social history, family history reviewed    Objective     Intake/Output:    Intake/Output Summary (Last 24 hours) at 07/07/17 0815  Last data filed at 07/07/17 0500   Gross per 24 hour   Intake          1610.44 ml   Output              370 ml   Net          1240.44 ml       Nutrition: NPO    Infusions:    insulin regular infusion 1 unit/mL (CCU use) 10 Units/hr Last Rate: 7.7 Units/hr (07/07/17 0648)   midazolam 1-10 mg/hr Last Rate: Stopped (07/07/17 0049)   norepinephrine 0.02-0.3 mcg/kg/min    propofol 5-80 mcg/kg/min Last Rate: 40 mcg/kg/min (07/07/17 0456)   sodium chloride 75 mL/hr Last Rate: 75 mL/hr (07/07/17 0440)   vecuronium (NORCURON) infusion 0.6 mcg/kg/min        Respiratory:  Vent Mode: VC+/AC  FiO2 (%):  [100 %] 100 %  S RR:  [16-24] 24  PEEP/CPAP (cm H2O):  [12 cm H20-18 cm H20] 18 cm H20  RI SUP:  [0 cm H20] 0 cm H20  MAP (cm H2O):  [20-24] 24    Vital Sign Min/Max for last 24 hours:  Temp  Min: 99.1 °F (37.3 °C)  Max: 100.9 °F (38.3 °C)   BP  Min: 88/55  Max: 141/72   Pulse  Min: 86  Max: 106   Resp  Min: 16  Max: 30   SpO2  Min: 67 %  Max: 94 %   Flow (L/min)  Min: 6  Max: 14   Weight  Min: 309 lb 4.9 oz (140 kg)  Max: 309 lb 4.9 oz  (140 kg)     Physical Exam:  99.1 °F (37.3 °C) (Oral) 86 92/51 24 (!) 86% (!) 309 lb 4.9 oz (140 kg) Body mass index is 56.57 kg/(m^2).  Physical Exam   Constitutional: She appears well-developed and well-nourished. She is sedated and intubated.   HENT:   Head: Normocephalic and atraumatic.   Nose: Nose normal.   Mouth/Throat: Oropharynx is clear and moist and mucous membranes are normal.   ETT, OGT   Eyes: Conjunctivae and lids are normal.   Pupils 2 mm and sluggish   Neck: Trachea normal. Neck supple. No thyroid mass present.   Cardiovascular: Normal rate and regular rhythm.  Exam reveals distant heart sounds. Exam reveals no gallop.    No murmur heard.  Pulmonary/Chest: Effort normal. She is intubated. She has decreased breath sounds. She has no wheezes.   Coarse BBS   Abdominal: Soft. Normal appearance. Bowel sounds are decreased.   obese   Musculoskeletal:   BLE edema       Vascular Status -  Her exam exhibits no right foot edema. Her exam exhibits no left foot edema.  Lymphadenopathy:        Head (right side): No submandibular adenopathy present.        Head (left side): No submandibular adenopathy present.     She has no cervical adenopathy.        Right: No supraclavicular adenopathy present.        Left: No supraclavicular adenopathy present.   Neurological:   Unresponsive, sedated   Skin: Skin is warm and dry. No cyanosis. Nails show no clubbing.   Psychiatric:   Unable to assess       Central Lines/PICC: present     Results Review:  I personally reviewed the patient's new clinical results.   Lab Results (last 24 hours)     Procedure Component Value Units Date/Time    CBC & Differential [[id number]] Collected:  07/06/17 0701    Specimen:  Blood Updated:  07/06/17 0819    Narrative:       The following orders were created for panel order CBC & Differential.  Procedure                               Abnormality         Status                     ---------                               -----------          ------                     Scan Slide[[id number]]                                       Final result               CBC Auto Differential[[id number]]        Abnormal            Final result                 Please view results for these tests on the individual orders.    Scan Slide [[id number]] Collected:  07/06/17 0701    Specimen:  Blood Updated:  07/06/17 0819     RBC Morphology Normal     WBC Morphology Normal     Platelet Estimate Adequate    Blood Gas, Arterial [[id number]]  (Abnormal) Collected:  07/06/17 0835    Specimen:  Arterial Blood Updated:  07/06/17 0847     Site --      Not performed at this site.        Bennett's Test --      Not performed at this site.        pH, Arterial 7.237 (L) pH units      pCO2, Arterial 52.3 (H) mm Hg      pO2, Arterial 84.3 mm Hg      HCO3, Arterial 21.8 (L) mmol/L      Base Excess, Arterial -6.1 (L) mmol/L      O2 Saturation, Arterial 94.5 %      Hemoglobin, Blood Gas 14.5 g/dL      Hematocrit, Blood Gas 43.0 %      CO2 Content 23.4     Sodium, Arterial 130.5 (L) mmol/L      Potassium, Arterial 3.83 mmol/L      Glucose, Arterial 583 mmol/L      Barometric Pressure for Blood Gas -- mmHg       Not performed at this site.        Modality --      Not performed at this site.        Ionized Calcium 4.6 mg/dL     [place] Draw [[id number]] Collected:  07/06/17 0620    Specimen:  Blood Updated:  07/06/17 0901    Narrative:       The following orders were created for panel order [place] Draw.  Procedure                               Abnormality         Status                     ---------                               -----------         ------                     Light Blue Top[[id number]]                                   Final result               Green Top (Gel)[[id number]]                                  Final result               Lavender Top[[id number]]                                     Final result               Gold Top - SST[[id number]]                                   Final result                  Please view results for these tests on the individual orders.    Light Blue Top [[id number]] Collected:  07/06/17 0701    Specimen:  Blood Updated:  07/06/17 0901     Extra Tube hold for add-on      Auto resulted       Lavender Top [[id number]] Collected:  07/06/17 0701    Specimen:  Blood Updated:  07/06/17 0901     Extra Tube hold for add-on      Auto resulted       Gold Top - SST [[id number]] Collected:  07/06/17 0701    Specimen:  Blood Updated:  07/06/17 0901     Extra Tube Hold for add-ons.      Auto resulted.       POC Glucose Fingerstick [[id number]]  (Abnormal) Collected:  07/06/17 1215    Specimen:  Blood Updated:  07/06/17 1215     Glucose 543 (A) mg/dL     Blood Gas, Arterial [[id number]]  (Abnormal) Collected:  07/06/17 1213    Specimen:  Arterial Blood from Arm, Right Updated:  07/06/17 1234     Site --      Not performed at this site.        Bennett's Test --      Not performed at this site.        pH, Arterial 7.193 (L) pH units      pCO2, Arterial 61.5 (H) mm Hg      pO2, Arterial 47.9 (L) mm Hg      HCO3, Arterial 23.1 mmol/L      Base Excess, Arterial -5.9 (L) mmol/L      O2 Saturation, Arterial 77.1 (L) %      Hemoglobin, Blood Gas 13.9 g/dL      Hematocrit, Blood Gas 41.0 %      CO2 Content 25.0     Sodium, Arterial 131.6 (L) mmol/L      Potassium, Arterial 4.46 mmol/L      Glucose, Arterial 582 mmol/L      Barometric Pressure for Blood Gas -- mmHg       Not performed at this site.        Modality --      Not performed at this site.        Ionized Calcium 4.6 mg/dL     Lavender Top [[id number]] Collected:  07/06/17 1259    Specimen:  Blood Updated:  07/06/17 1401     Extra Tube hold for add-on      Auto resulted       Gold Top - SST [[id number]] Collected:  07/06/17 1258    Specimen:  Blood Updated:  07/06/17 1401     Extra Tube Hold for add-ons.      Auto resulted.       Green Top (Gel) [[id number]] Collected:  07/06/17 1259    Specimen:  Blood Updated:  07/06/17 1401     Extra Tube Hold  for add-ons.      Auto resulted.       Basic Metabolic Panel [[id number]]  (Abnormal) Collected:  07/06/17 1311    Specimen:  Blood Updated:  07/06/17 1411     Glucose 588 (C) mg/dL      BUN 33 (H) mg/dL      Creatinine 1.84 (H) mg/dL      Sodium 132 (L) mmol/L      Potassium 4.6 mmol/L      Chloride 94 (L) mmol/L      CO2 21.0 (L) mmol/L      Calcium 8.1 (L) mg/dL      eGFR Non African Amer 30 (L) mL/min/1.73      BUN/Creatinine Ratio 17.9     Anion Gap 17.0 (H) mmol/L     Lactate Acid, Reflex [[id number]]  (Abnormal) Collected:  07/06/17 1311    Specimen:  Blood Updated:  07/06/17 1413     Lactate 2.4 (C) mmol/L     POC Glucose Fingerstick [[id number]]  (Abnormal) Collected:  07/06/17 1417    Specimen:  Blood Updated:  07/06/17 1417     Glucose 521 (A) mg/dL     Extra Tubes [[id number]] Collected:  07/06/17 1316    Specimen:  Blood from Blood, Venous Line Updated:  07/06/17 1501    Narrative:       The following orders were created for panel order Extra Tubes.  Procedure                               Abnormality         Status                     ---------                               -----------         ------                     Lavender Top[[id number]]                                     Final result               Gold Top - SST[[id number]]                                   Final result                 Please view results for these tests on the individual orders.    Lavender Top [[id number]] Collected:  07/06/17 1316    Specimen:  Blood Updated:  07/06/17 1501     Extra Tube hold for add-on      Auto resulted       Gold Top - SST [[id number]] Collected:  07/06/17 1316    Specimen:  Blood Updated:  07/06/17 1501     Extra Tube Hold for add-ons.      Auto resulted.       Extra Tubes [[id number]] Collected:  07/06/17 1258    Specimen:  Blood from Blood, Venous Line Updated:  07/06/17 1538    Narrative:       The following orders were created for panel order Extra Tubes.  Procedure                               Abnormality          Status                     ---------                               -----------         ------                     Light Blue Top[[id number]]                                                              Lavender Top[[id number]]                                     Final result               Gold Top - SST[[id number]]                                   Final result               Green Top (Gel)[[id number]]                                  Final result                 Please view results for these tests on the individual orders.    Glucose, Random [[id number]]  (Abnormal) Collected:  07/06/17 1558    Specimen:  Blood Updated:  07/06/17 1630     Glucose 525 (C) mg/dL     S. Pneumo Ag Urine or CSF [[id number]]  (Normal) Collected:  07/06/17 1619    Specimen:  Urine from Urine, Catheter Updated:  07/06/17 1736     Strep Pneumo Ag Negative    Legionella Antigen, Urine [[id number]]  (Abnormal) Collected:  07/06/17 1619    Specimen:  Urine from Urine, Catheter Updated:  07/06/17 1819     LEGIONELLA ANTIGEN, URINE Positive (A)    Narrative:       RESULT CONFIRMED BY REPEAT ANALYSIS    Urinalysis With / Culture If Indicated [[id number]]  (Abnormal) Collected:  07/06/17 1619    Specimen:  Urine from Urine, Catheter Updated:  07/06/17 1955     Color, UA Dark Yellow     Appearance, UA Turbid (A)     pH, UA <=5.0     Specific Gravity, UA 1.025     Glucose, UA >=1000 mg/dL (3+) (A)     Ketones, UA Trace (A)     Bilirubin, UA Moderate (2+) (A)     Blood, UA Large (3+) (A)     Protein,  mg/dL (2+) (A)     Leuk Esterase, UA Small (1+) (A)     Nitrite, UA Negative     Urobilinogen, UA 1.0 E.U./dL    Urinalysis, Microscopic Only [[id number]]  (Abnormal) Collected:  07/06/17 1619    Specimen:  Urine from Urine, Catheter Updated:  07/06/17 2003     RBC, UA 31-50 (A) /HPF      WBC, UA 21-30 (A) /HPF      Bacteria, UA 1+ (A) /HPF      Squamous Epithelial Cells, UA 3-5 (A) /HPF      Hyaline Casts, UA None Seen /LPF      Amorphous Urate  Crystals, UA Large/3+ /HPF      Methodology Manual Light Microscopy    Respiratory Culture [[id number]] Collected:  07/06/17 1734    Specimen:  Sputum from ET Suction Updated:  07/06/17 2059     Respiratory Culture Rejected     Gram Stain Result Few (2+) Epithelial cells per low power field      Rare (1+) WBCs per low power field      Specimen rejected based upon microscopic exam of gram stain    Narrative:         Specimen rejected due to microscopic exam of gram stain.    POC Glucose Fingerstick [[id number]]  (Abnormal) Collected:  07/06/17 1924    Specimen:  Blood Updated:  07/06/17 2103     Glucose 359 (H) mg/dL       Meter: [id number]: [id number] LEANDRA ARNALDO       Blood Culture [[id number]]  (Normal) Collected:  07/06/17 0906    Specimen:  Blood from Hand, Right Updated:  07/06/17 2201     Blood Culture No growth at less than 24 hours    POC Glucose Fingerstick [[id number]]  (Abnormal) Collected:  07/06/17 2034    Specimen:  Blood Updated:  07/07/17 0304     Glucose 341 (H) mg/dL       Meter: [id number]: [id number] LEANDRA CAHLE       POC Glucose Fingerstick [[id number]]  (Abnormal) Collected:  07/06/17 2139    Specimen:  Blood Updated:  07/07/17 0304     Glucose 289 (H) mg/dL       Meter: [id number]: [id number] LEANDRA CAHLE       POC Glucose Fingerstick [[id number]]  (Abnormal) Collected:  07/06/17 2235    Specimen:  Blood Updated:  07/07/17 0305     Glucose 259 (H) mg/dL       Meter: [id number]: [id number] LEANDRA CAHLE       POC Glucose Fingerstick [[id number]]  (Abnormal) Collected:  07/06/17 2346    Specimen:  Blood Updated:  07/07/17 0305     Glucose 237 (H) mg/dL       Meter: [id number]: [id number] LEANDRA CAHLE       POC Glucose Fingerstick [[id number]]  (Abnormal) Collected:  07/07/17 0042    Specimen:  Blood Updated:  07/07/17 0305     Glucose 220 (H) mg/dL       Meter: [id number]: [id number] [facility]       [facility] Glucose  Fingerstick [[id number]]  (Abnormal) Collected:  07/07/17 0151    Specimen:  Blood Updated:  07/07/17 0305     Glucose 232 (H) mg/dL       Meter: [id number]: [id number] [facility]       POC Glucose Fingerstick [[id number]]  (Abnormal) Collected:  07/07/17 0253    Specimen:  Blood Updated:  07/07/17 0305     Glucose 196 (H) mg/dL       Meter: [id number]: [id number] [facility]       CBC (No Diff) [[id number]]  (Abnormal) Collected:  07/07/17 0356    Specimen:  Blood Updated:  07/07/17 0432     WBC 17.96 (H) 10*3/mm3      RBC 4.83 10*6/mm3      Hemoglobin 15.4 g/dL      Hematocrit 43.3 %      MCV 89.6 fL      MCH 31.9 pg      MCHC 35.6 g/dL      RDW 14.9 (H) %      RDW-SD 49.3 (H) fl      MPV -- fL       Unable to obtain result        Platelets 237 10*3/mm3     Blood Gas, Arterial [[id number]]  (Abnormal) Collected:  07/07/17 0421    Specimen:  Arterial Blood Updated:  07/07/17 0457     Site --      Not performed at this site.        Bennett's Test --      Not performed at this site.        pH, Arterial 6.902 (L) pH units      pCO2, Arterial 120.8 (H) mm Hg      pO2, Arterial 53.8 (L) mm Hg      HCO3, Arterial 23.2 mmol/L      Base Excess, Arterial -12.6 (L) mmol/L      O2 Saturation, Arterial 84.0 (L) %      Hemoglobin, Blood Gas 14.7 g/dL      Hematocrit, Blood Gas 43.0 %      CO2 Content 26.9     Sodium, Arterial 134.6 (L) mmol/L      Potassium, Arterial 5.46 (H) mmol/L      Glucose, Arterial 200 mmol/L      Barometric Pressure for Blood Gas -- mmHg       Not performed at this site.        Modality --      Not performed at this site.        Ionized Calcium 4.7 mg/dL     Comprehensive Metabolic Panel [[id number]]  (Abnormal) Collected:  07/07/17 0356    Specimen:  Blood Updated:  07/07/17 0513     Glucose 185 (H) mg/dL      BUN 41 (H) mg/dL       Specimen hemolyzed. Results may be affected.        Creatinine 3.36 (H) mg/dL      Sodium 135 (L) mmol/L      Potassium 4.9 mmol/L       Specimen  hemolyzed.  Results may be affected.        Chloride 98 mmol/L      CO2 19.0 (L) mmol/L      Calcium 8.2 (L) mg/dL      Total Protein 8.2 g/dL      Albumin 3.40 g/dL      ALT (SGPT) 97 (H) U/L       Specimen hemolyzed.  Results may be affected.        AST (SGOT) 213 (H) U/L       Specimen hemolyzed.  Results may be affected.        Alkaline Phosphatase 189 (H) U/L       Specimen hemolyzed. Results may be affected.        Total Bilirubin 3.6 (H) mg/dL      eGFR Non African Amer 15 (L) mL/min/1.73      Globulin 4.8 (H) gm/dL      A/G Ratio 0.7 (L) g/dL      BUN/Creatinine Ratio 12.2     Anion Gap 18.0 (H) mmol/L     Urine Culture [[id number]]  (Normal) Collected:  07/06/17 1619    Specimen:  Urine from Urine, Catheter Updated:  07/07/17 0606     Urine Culture Culture in progress    Respiratory Culture [[id number]] Collected:  07/06/17 2243    Specimen:  Sputum from ET Suction Updated:  07/07/17 0655     Respiratory Culture Culture in progress     Gram Stain Result Rare (1+) WBCs seen      No Epithelial cells seen      Mixed bacterial madonna    Blood Culture [[id number]]  (Normal) Collected:  07/06/17 0701    Specimen:  Blood from Arm, Left Updated:  07/07/17 0801     Blood Culture No growth at 24 hours    Blood Gas, Arterial [[id number]] Collected:  07/07/17 0811    Specimen:  Arterial Blood Updated:  07/07/17 0811          Results from last 7 days  Lab Units 07/07/17  0421   PH, ARTERIAL pH units 6.902*   PO2 ART mm Hg 53.8*   PCO2, ARTERIAL mm Hg 120.8*   HCO3 ART mmol/L 23.2     Lab Results   Component Value Date    BLOODCX No growth at less than 24 hours 07/06/2017     Lab Results   Component Value Date    URINECX Culture in progress 07/06/2017       I independently reviewed the patient's new imaging, including images and reports.  Imaging Results (last 24 hours)     Procedure Component Value Units Date/Time    XR Chest Post CVA Port [[id number]] Collected:  07/06/17 1305     Updated:  07/06/17 1329    Narrative:        Chest single view.        CLINICAL INDICATION: Shortness of breath. PICC line placement.    COMPARISON: Chest July 6, 2017 at 6:05 AM.    FINDINGS: Dense consolidation of the right upper lobe. Diffuse  infiltrative changes involving most of the left lung. No changes  since prior examination allowing for differences in technique.      Impression:       CONCLUSION: Right arm PICC line catheter with catheter tip in  medial right apex i.e. the junction of subclavian vein and  superior vena cava in satisfactory position.    Electronically signed by:  Anselmo Frey MD  7/6/2017 1:27 PM CDT  Workstation: [id number]    IR PICC wo fluoro guidance [[id number]] Resulted:  07/06/17 1402     Updated:  07/06/17 1402    Narrative:       This procedure was auto-finalized with no dictation required.    US Guided Vascular Access [[id number]] Resulted:  07/06/17 1418     Updated:  07/06/17 1418    Narrative:       This procedure was auto-finalized with no dictation required.    XR Chest 1 View [[id number]] Collected:  07/06/17 1449     Updated:  07/06/17 1527    Narrative:       [facility] [place]    Patient Name: CAROLE JAVIER    ORDERING: YONY MOORE     ATTENDING:    REFERRING: YONY MOORE    -----------------------    PROCEDURE: Portable chest x-ray    TECHNIQUE: Single AP view of the chest    COMPARISON: July 6, 2017    HISTORY: Intubated patient    FINDINGS:     Life-support devices: Right upper extremity PICC terminates in  the right subclavian region. Endotracheal tube terminates  approximately 3.9 cm above the brit. Enteric tube extends below  the margin of the film but appears to be below the diaphragm.    Lungs/pleura: Bilateral diffuse consolidation concerning for  pneumonia and/or severe pulmonary edema, right worse than left.  Left-sided opacities have increased compared to the prior study.    Heart, hilar and mediastinal structures:  Normal accounting for  projection and technique      Impression:         CONCLUSION:  Right upper extremity PICC terminates in the right subclavian  region. Endotracheal tube terminates approximately 3.9 cm above  the brit. Enteric tube extends below the margin of the film but  appears to be below the diaphragm.  Bilateral diffuse consolidation concerning for pneumonia and/or  severe pulmonary edema, right worse than left. Left-sided  opacities have increased compared to the prior study.    Electronically signed by:  Adam Hatch MD  7/6/2017 3:25 PM CDT  Workstation: [id number]          All medications reviewed.     artificial tears  Both Eyes Q4H   chlorhexidine 15 mL Mouth/Throat Q12H   famotidine 20 mg Intravenous BID   heparin (porcine) 5,000 Units Subcutaneous Q12H   insulin aspart 0-9 Units Subcutaneous 4x Daily AC & at Bedtime   levoFLOXacin 500 mg Intravenous Q24H   levothyroxine 300 mcg Oral Daily   methylPREDNISolone sodium succinate 60 mg Intravenous Q8H   nystatin  Topical Q12H   sodium chloride 2,000 mL Intravenous Once         Assessment/Plan     ASSESSMENT:  # Acute hypoxemic and hypercarbic respiratory failure  # ARDS  # Legionella pneumonia  # Septic shock  # Probable acute on chronic cor pulmonale with hypervolemia  # JEN/CKD, oliguric  # Hyperglycemia/ poorly controlled DM  # Metabolic and respiratory acidosis  # Tobacco use      PLAN:  -AC/VC per ARDSnet  -Repeat ABG and adjust vent as needed. Goal pH >7.2, PaO2 >55, sats >88%  -Cont heavy sedation/ paralytics to prevent pt/ vent dysynchrony  -Cont zosyn/ Levaquin  -F/u cultures. NGTD.  -NS at 75cc/hr  -Levo to keep SBP>90  -Solumedrol 60mg IV q8hrs  -No turning  -F/u TTE  -Follow renal fxn and consider renal consult for probable HD need   -TFs when pt more stable  -PPX: Heparin TID, Pepcid  -FULL CODE    Need CM to locate next of kin. Grim prognosis as pt on maximal vent support but inadequate oxygenation. She likely has some degree of HIE/ brain injury from prolonged hypoxemia.    D/w RN, RT    I will be out  [facility] until 7/17. Juliet Raymond and Antonette will cover in my absence.    Addendum: Repeat ABG reviewed.  No improvement in oxygenation or ventilation.  Increased respiratory rate to 30 with subsequent increasing minute ventilation to 10 L.  Remains on 100% FiO2 and PEEP of 18.  Peak and plateau pressures remain in the mid to low 30s.  She remains off sedation and paralytics with no response and no effort at spontaneous respirations.  Prognosis grim.    Critical Care Time Spent: 43 minutes  I personally provided care to this critically ill patient as documented above.  Critical care time does not include time spent on separately billed procedures.  None of my critical care time was concurrent with other critical care providers.         This document has been electronically signed by Thao Broussard MD on July 7, 2017 8:15 AM      [phone]    Dictated using Dragon

## 2017-07-07 NOTE — PLAN OF CARE
Problem: Patient Care Overview (Adult)  Goal: Plan of Care Review  Outcome: Ongoing (interventions implemented as appropriate)    07/06/17 2013   Coping/Psychosocial Response Interventions   Plan Of Care Reviewed With caregiver   Patient Care Overview   Progress declining   Outcome Evaluation   Outcome Summary/Follow up Plan Patient O2 sats remain below normal limits; patient not tolerating turns exhibited by further desaturation       Goal: Adult Individualization and Mutuality  Outcome: Ongoing (interventions implemented as appropriate)  Goal: Discharge Needs Assessment  Outcome: Ongoing (interventions implemented as appropriate)    Problem: Respiratory Insufficiency (Adult)  Goal: Identify Related Risk Factors and Signs and Symptoms  Outcome: Ongoing (interventions implemented as appropriate)  Goal: Acid/Base Balance  Outcome: Ongoing (interventions implemented as appropriate)  Goal: Effective Ventilation  Outcome: Ongoing (interventions implemented as appropriate)    Problem: Diabetes, Type 2 (Adult)  Goal: Signs and Symptoms of Listed Potential Problems Will be Absent or Manageable (Diabetes, Type 2)  Outcome: Ongoing (interventions implemented as appropriate)    Problem: Fall Risk (Adult)  Goal: Identify Related Risk Factors and Signs and Symptoms  Outcome: Ongoing (interventions implemented as appropriate)  Goal: Absence of Falls  Outcome: Ongoing (interventions implemented as appropriate)

## 2017-07-07 NOTE — PROGRESS NOTES
Progress Note  William Lew MD  Hospitalist     LOS: 1 day   Patient Care Team:  No Known Provider as PCP - General    Chief Complaint: severe dyspnea    Subjective     Interval History:     Patient Complaints: severe shortness of breath / required intubation and mechanical ventilation    History taken from: patient / chart    Medication Review:   Current Facility-Administered Medications   Medication Dose Route Frequency Provider Last Rate Last Dose   • acetaminophen (TYLENOL) 160 MG/5ML solution 650 mg  650 mg Oral Q6H PRN Shant Shin MD       • artificial tears ophthalmic ointment   Both Eyes Q4H William Lew MD       • chlorhexidine (PERIDEX) 0.12 % solution 15 mL  15 mL Mouth/Throat Q12H William Lew MD   15 mL at 07/07/17 2059   • dextrose (D50W) solution 25 g  25 g Intravenous Q15 Min PRN William Lew MD       • dextrose (GLUTOSE) oral gel 15 g  15 g Oral Q15 Min PRN William Lew MD       • famotidine (PEPCID) injection 20 mg  20 mg Intravenous BID William Lew MD   20 mg at 07/07/17 1847   • fentaNYL citrate (PF) (SUBLIMAZE) injection 25 mcg  25 mcg Intravenous Q1H PRN Thao Broussard MD       • glucagon (human recombinant) (GLUCAGEN DIAGNOSTIC) injection 1 mg  1 mg Subcutaneous Q15 Min PRN William Lew MD       • heparin (porcine) 5000 UNIT/ML injection 5,000 Units  5,000 Units Subcutaneous Q12H William Lew MD   5,000 Units at 07/07/17 2058   • insulin aspart (novoLOG) injection 0-9 Units  0-9 Units Subcutaneous 4x Daily AC & at Bedtime William Lew MD       • insulin regular (HumuLIN R,NovoLIN R) 100 Units in sodium chloride 0.9 % 100 mL (1 Units/mL) infusion  10 Units/hr Intravenous Titrated William Lew MD 4 mL/hr at 07/07/17 2033 4 Units/hr at 07/07/17 2033   • levoFLOXacin (LEVAQUIN) 500 mg/100 mL D5W (premix) (LEVAQUIN) 500 mg  500 mg Intravenous Q24H Shant Shin MD   500 mg at 07/07/17 0823   • levothyroxine (SYNTHROID, LEVOTHROID) tablet 300 mcg  300 mcg Oral Daily  William Lew MD   300 mcg at 07/07/17 0827   • methylPREDNISolone sodium succinate (SOLU-Medrol) injection 60 mg  60 mg Intravenous Q8H Thao Broussard MD   60 mg at 07/07/17 2100   • midazolam (VERSED) 50 mg in sodium chloride 0.9 % 100 mL (0.5 mg/mL) infusion  1-10 mg/hr Intravenous Titrated Thao Broussard MD   Stopped at 07/07/17 0049   • midazolam (VERSED) injection 1 mg  1 mg Intravenous Q1H PRN Thao Broussard MD       • norepinephrine (LEVOPHED) 8,000 mcg in sodium chloride 0.9 % 250 mL (32 mcg/mL) infusion  0.02-0.3 mcg/kg/min Intravenous Titrated Thao Broussard MD       • nystatin (MYCOSTATIN) powder   Topical Q12H William Lew MD       • propofol (DIPRIVAN) infusion 10 mg/mL 100 mL  5-80 mcg/kg/min Intravenous Titrated Thao Broussard MD 32.9 mL/hr at 07/07/17 0456 40 mcg/kg/min at 07/07/17 0456   • sodium bicarbonate 8.4 % 150 mEq in dextrose (D5W) 5 % 1,000 mL infusion (greater than 75 mEq)  125 mL/hr Intravenous Continuous Olya Singh  mL/hr at 07/07/17 2058 125 mL/hr at 07/07/17 2058   • sodium chloride 0.9 % flush 1-10 mL  1-10 mL Intravenous PRN William Lew MD       • sodium chloride 0.9 % flush 10 mL  10 mL Intravenous PRN Daniel Lunsford MD   10 mL at 07/06/17 1702   • vecuronium (NORCURON) 100 mg in sodium chloride 0.9 % 100 mL (1 mg/mL) infusion  0.6 mcg/kg/min Intravenous Titrated Shant Shin MD       • vecuronium (NORCURON) bolus from bag 1 mg/mL 6.85 mg  50 mcg/kg Intravenous Once PRN William Lew MD           Review of Systems:   Review of Systems   Unable to perform ROS: Intubated       Objective     Vital Signs  Temp:  [99 °F (37.2 °C)-100.8 °F (38.2 °C)] 99.2 °F (37.3 °C)  Heart Rate:  [] 94  Resp:  [16-35] 35  BP: ()/(50-68) 108/58  FiO2 (%):  [100 %] 100 %    Physical Exam:  Physical Exam   Constitutional:   Morbidly obese   HENT:   Head: Normocephalic and atraumatic.   Eyes: EOM are normal. Pupils are equal, round, and reactive to light. No scleral  icterus.   Neck: Normal range of motion. Neck supple.   Cardiovascular: Regular rhythm.  Tachycardia present.    No murmur heard.  Pulmonary/Chest: She has wheezes. She has rales.   Abdominal: Soft. Bowel sounds are normal. She exhibits no distension. There is no tenderness. There is no guarding.   Musculoskeletal: She exhibits no edema or tenderness.   Neurological:   Sedated / intubated   Skin: Skin is warm and dry.   Vitals reviewed.       Results Review:    Lab Results (last 24 hours)     Procedure Component Value Units Date/Time    POC Glucose Fingerstick [[id number]]  (Abnormal) Collected:  07/06/17 2034    Specimen:  Blood Updated:  07/07/17 0304     Glucose 341 (H) mg/dL       Meter: [id number]: [id number] LEANDRA CAHLE       POC Glucose Fingerstick [[id number]]  (Abnormal) Collected:  07/06/17 2139    Specimen:  Blood Updated:  07/07/17 0304     Glucose 289 (H) mg/dL       Meter: [id number]: [id number] LEANDRA CAHLE       POC Glucose Fingerstick [[id number]]  (Abnormal) Collected:  07/06/17 2235    Specimen:  Blood Updated:  07/07/17 0305     Glucose 259 (H) mg/dL       Meter: [id number]: [id number] LEADNRA CAHLE       POC Glucose Fingerstick [[id number]]  (Abnormal) Collected:  07/06/17 2346    Specimen:  Blood Updated:  07/07/17 0305     Glucose 237 (H) mg/dL       Meter: [id number]: [id number] LEANDRA CAHLE       POC Glucose Fingerstick [[id number]]  (Abnormal) Collected:  07/07/17 0042    Specimen:  Blood Updated:  07/07/17 0305     Glucose 220 (H) mg/dL       Meter: [id number]: [id number] LEANDRA CAHLE       POC Glucose Fingerstick [[id number]]  (Abnormal) Collected:  07/07/17 0151    Specimen:  Blood Updated:  07/07/17 0305     Glucose 232 (H) mg/dL       Meter: [id number]: [id number] LEANDRA CAHLE       POC Glucose Fingerstick [[id number]]  (Abnormal) Collected:  07/07/17 0253    Specimen:  Blood Updated:  07/07/17 0307      Glucose 196 (H) mg/dL       Meter: [id number]: [id number] LEANDRA RAMOS       CBC (No Diff) [[id number]]  (Abnormal) Collected:  07/07/17 0356    Specimen:  Blood Updated:  07/07/17 0432     WBC 17.96 (H) 10*3/mm3      RBC 4.83 10*6/mm3      Hemoglobin 15.4 g/dL      Hematocrit 43.3 %      MCV 89.6 fL      MCH 31.9 pg      MCHC 35.6 g/dL      RDW 14.9 (H) %      RDW-SD 49.3 (H) fl      MPV -- fL       Unable to obtain result        Platelets 237 10*3/mm3     Blood Gas, Arterial [[id number]]  (Abnormal) Collected:  07/07/17 0421    Specimen:  Arterial Blood Updated:  07/07/17 0457     Site --      Not performed at this site.        Bennett's Test --      Not performed at this site.        pH, Arterial 6.902 (L) pH units      pCO2, Arterial 120.8 (H) mm Hg      pO2, Arterial 53.8 (L) mm Hg      HCO3, Arterial 23.2 mmol/L      Base Excess, Arterial -12.6 (L) mmol/L      O2 Saturation, Arterial 84.0 (L) %      Hemoglobin, Blood Gas 14.7 g/dL      Hematocrit, Blood Gas 43.0 %      CO2 Content 26.9     Sodium, Arterial 134.6 (L) mmol/L      Potassium, Arterial 5.46 (H) mmol/L      Glucose, Arterial 200 mmol/L      Barometric Pressure for Blood Gas -- mmHg       Not performed at this site.        Modality --      Not performed at this site.        Ionized Calcium 4.7 mg/dL     Comprehensive Metabolic Panel [[id number]]  (Abnormal) Collected:  07/07/17 0356    Specimen:  Blood Updated:  07/07/17 0513     Glucose 185 (H) mg/dL      BUN 41 (H) mg/dL       Specimen hemolyzed. Results may be affected.        Creatinine 3.36 (H) mg/dL      Sodium 135 (L) mmol/L      Potassium 4.9 mmol/L       Specimen hemolyzed.  Results may be affected.        Chloride 98 mmol/L      CO2 19.0 (L) mmol/L      Calcium 8.2 (L) mg/dL      Total Protein 8.2 g/dL      Albumin 3.40 g/dL      ALT (SGPT) 97 (H) U/L       Specimen hemolyzed.  Results may be affected.        AST (SGOT) 213 (H) U/L       Specimen hemolyzed.  Results may be  affected.        Alkaline Phosphatase 189 (H) U/L       Specimen hemolyzed. Results may be affected.        Total Bilirubin 3.6 (H) mg/dL      eGFR Non African Amer 15 (L) mL/min/1.73      Globulin 4.8 (H) gm/dL      A/G Ratio 0.7 (L) g/dL      BUN/Creatinine Ratio 12.2     Anion Gap 18.0 (H) mmol/L     Urine Culture [[id number]]  (Normal) Collected:  07/06/17 1619    Specimen:  Urine from Urine, Catheter Updated:  07/07/17 0606     Urine Culture Culture in progress    Respiratory Culture [[id number]] Collected:  07/06/17 2243    Specimen:  Sputum from ET Suction Updated:  07/07/17 0655     Respiratory Culture Culture in progress     Gram Stain Result Rare (1+) WBCs seen      No Epithelial cells seen      Mixed bacterial madonna    Blood Culture [[id number]]  (Normal) Collected:  07/06/17 0701    Specimen:  Blood from Arm, Left Updated:  07/07/17 0801     Blood Culture No growth at 24 hours    Blood Gas, Arterial [[id number]]  (Abnormal) Collected:  07/07/17 0811    Specimen:  Arterial Blood Updated:  07/07/17 0821     Site --      Not performed at this site.        Bennett's Test --      Not performed at this site.        pH, Arterial 6.936 (L) pH units      pCO2, Arterial 119.1 (H) mm Hg      pO2, Arterial 54.3 (L) mm Hg      HCO3, Arterial 24.8 mmol/L      Base Excess, Arterial -10.4 (L) mmol/L      O2 Saturation, Arterial 86.1 %      Hemoglobin, Blood Gas 14.2 g/dL      Hematocrit, Blood Gas 42.0 %      CO2 Content 28.4 (H)     Sodium, Arterial 136.0 (L) mmol/L      Potassium, Arterial 5.09 (H) mmol/L      Glucose, Arterial 95 mmol/L      Barometric Pressure for Blood Gas -- mmHg       Not performed at this site.        Modality --      Not performed at this site.        Ionized Calcium 4.6 mg/dL     Blood Culture [[id number]]  (Normal) Collected:  07/06/17 0906    Specimen:  Blood from Hand, Right Updated:  07/07/17 1001     Blood Culture No growth at 24 hours    Blood Gas, Arterial [[id number]]  (Abnormal) Collected:   07/07/17 1004    Specimen:  Arterial Blood Updated:  07/07/17 1017     Site --      Not performed at this site.        Bennett's Test --      Not performed at this site.        pH, Arterial 6.994 (L) pH units      pCO2, Arterial 93.1 (H) mm Hg      pO2, Arterial 54.6 (L) mm Hg      HCO3, Arterial 22.1 mmol/L      Base Excess, Arterial -11.3 (L) mmol/L      O2 Saturation, Arterial 86.4 (L) %      Hemoglobin, Blood Gas 14.4 g/dL      Hematocrit, Blood Gas 42.0 %      CO2 Content 25.0     Sodium, Arterial 135.5 (L) mmol/L      Potassium, Arterial 5.03 (H) mmol/L      Glucose, Arterial 124 mmol/L      Barometric Pressure for Blood Gas -- mmHg       Not performed at this site.        Modality --      Not performed at this site.        Ionized Calcium 4.5 mg/dL     POC Glucose Fingerstick [[id number]]  (Abnormal) Collected:  07/06/17 0907    Specimen:  Blood Updated:  07/07/17 1431     Glucose 571 (C) mg/dL       RN NotifiedMeter: [id number]: [id number] NICKI TELLES       POC Glucose Fingerstick [[id number]]  (Abnormal) Collected:  07/06/17 1214    Specimen:  Blood Updated:  07/07/17 1431     Glucose 543 (C) mg/dL       Meter: [id number]: [id number] [employer]       POC Glucose Fingerstick [[id number]]  (Abnormal) Collected:  07/06/17 1416    Specimen:  Blood Updated:  07/07/17 1431     Glucose 521 (C) mg/dL       Meter: [id number]: [id number] [facility]       POC Glucose Fingerstick [[id number]]  (Abnormal) Collected:  07/06/17 1557    Specimen:  Blood Updated:  07/07/17 1432     Glucose 506 (C) mg/dL       Meter: [id number]: [id number] LEANDRA MCINTOSH       POC Glucose Fingerstick [[id number]]  (Abnormal) Collected:  07/06/17 1721    Specimen:  Blood Updated:  07/07/17 1432     Glucose 443 (H) mg/dL       Meter: [id number]: [id number] [facility]       POC Glucose Fingerstick [[id number]]  (Abnormal) Collected:  07/06/17 3798    Specimen:  Blood Updated:   07/07/17 1432     Glucose 425 (H) mg/dL       Meter: [id number]: [id number] [facility]       Blood Gas, Arterial [[id number]]  (Abnormal) Collected:  07/07/17 1516    Specimen:  Arterial Blood Updated:  07/07/17 1525     Site --      Not performed at this site.        Bennett's Test --      Not performed at this site.        pH, Arterial 7.050 (L) pH units      pCO2, Arterial 72.3 (H) mm Hg      pO2, Arterial 58.5 (L) mm Hg      HCO3, Arterial 19.6 (L) mmol/L      Base Excess, Arterial -12.1 (L) mmol/L      O2 Saturation, Arterial 88.3 (L) %      Hemoglobin, Blood Gas 14.3 g/dL      Hematocrit, Blood Gas 42.0 %      CO2 Content 21.8 (L)     Sodium, Arterial 135.4 (L) mmol/L      Potassium, Arterial 5.46 (H) mmol/L      Glucose, Arterial 107 mmol/L      Barometric Pressure for Blood Gas -- mmHg       Not performed at this site.        Modality --      Not performed at this site.        Ionized Calcium 4.4 (L) mg/dL     Sodium, Urine, Random [[id number]] Collected:  07/07/17 2056    Specimen:  Urine from Urine, Clean Catch Updated:  07/07/17 2127    Creatinine, Urine, Random [[id number]] Collected:  07/07/17 2056    Specimen:  Urine from Urine, Clean Catch Updated:  07/07/17 2127          Imaging Results (last 24 hours)     ** No results found for the last 24 hours. **          Assessment/Plan     Principal Problem:    Acute respiratory failure with hypoxia and hypercapnia  Active Problems:    Diabetes mellitus type 2, uncontrolled    Bilateral pneumonia    ARDS (adult respiratory distress syndrome)    Sepsis    Acute respiratory acidosis    Metabolic acidosis    Morbid obesity    Obstructive apnea    CKD (chronic kidney disease) stage 3, GFR 30-59 ml/min    Abnormal LFTs    Pulmonary hypertension    Legionella infection    UTI (urinary tract infection)    Severe respiratory acidosis / superimposed metabolic acidosis due to above (pH of 6.9 with pCO2 of 120, pO2 55, HCO3 of 19) complicated now by acute renal  failure (creatinine 3.36).    Continue with the maximal ventilatory support, IV fluid, insulin, IV antibiotics, IV pressors if needed.    William Lew MD  07/07/17  9:47 PM

## 2017-07-07 NOTE — CONSULTS
Adult Nutrition  Assessment    Patient Name:  Lili Méndez  YOB: 1975  MRN: [id number]  Admit Date:  7/6/2017    Assessment Date:  7/7/2017                            Comments:  Pt on RD list r/t BMI 56 which is compatible w/morbid obesity. Pt admitted w/respiratory distress and is currently on vent support. MD notes very poor prognosis. Currently trying to locate next of kin to to determine how to proceed. RD will monitor and make recs for TF if/when appropriate.         Electronically signed by:  Ying Simpson RD  07/07/17 10:21 AM

## 2017-07-08 PROBLEM — R34 ANURIA: Status: ACTIVE | Noted: 2017-01-01

## 2017-07-08 PROBLEM — N17.9 ACUTE RENAL FAILURE (HCC): Status: ACTIVE | Noted: 2017-01-01

## 2017-07-08 NOTE — CONSULTS
[facility]  [address] [place] [address]. [address]  T - [phone]  F  [phone]     Consultation         PATIENT  DEMOGRAPHICS   PATIENT NAME: Lili Méndez                      PHYSICIAN: Olya Singh MD  : 1975  MRN: [id number]    Subjective   SUBJECTIVE   Referring Provider: Dr Lew  Reason for Consultation: elsie metabolic acidosis oliguric  History of present illness:      Ms Méndez is a 42-year-old female with a history of diabetes mellitus type 2 hypertension dyslipidemia who came in yesterday after shortness of air cough and generalized weakness.  History is limited as patient is intubated.  There are no family members available at the bedside.  Earlier on arrival she was able to give history but then had a worsening respiratory distress and then eventually intubated.    Her baseline creatinine is close to 1 but then it progressed very rapidly and it's up to 5.46 now.  She is currently oliguric and despite volume resuscitation she didn't have any much of the urine output.  She also has marked respiratory acidosis along with the metabolic acidosis.  We have started her on bicarbonate drip last night.    Past Medical History:   Diagnosis Date   • Diabetes mellitus    • Hyperlipidemia    • Hypertension    • Migraine      Past Surgical History:   Procedure Laterality Date   •  SECTION       Family History   Problem Relation Age of Onset   • Hypertension Mother      Social History   Substance Use Topics   • Smoking status: Current Every Day Smoker     Packs/day: 1.00     Types: Cigarettes   • Smokeless tobacco: None      Comment: 1 pack per week   • Alcohol use No     Allergies:  Erythromycin; Amoxicillin; Codeine; and Bupropion     REVIEW OF SYSTEMS    Review of Systems   Unable to perform ROS: Intubated       Objective   OBJECTIVE   Vital Signs  Temp:  [98.2 °F (36.8 °C)-99.2 °F (37.3 °C)] 98.2 °F (36.8 °C)  Heart Rate:  [91-96] 96  Resp:  [35] 35  BP:  "()/(51-68) 108/57  FiO2 (%):  [100 %] 100 %    Flowsheet Rows         First Filed Value    Admission Height  62\" (157.5 cm) Documented at 07/06/2017 0611    Admission Weight  (!)  302 lb (137 kg) Documented at 07/06/2017 0611           I/O last 3 completed shifts:  In: 2293.4 [I.V.:1923.4; Other:120; IV Piggyback:250]  Out: 150 [Urine:140; Other:10]    PHYSICAL EXAM    Physical Exam   Constitutional: She is oriented to person, place, and time. She appears well-developed.   HENT:   Head: Normocephalic.   Eyes: Pupils are equal, round, and reactive to light.   Cardiovascular: Normal rate, regular rhythm and normal heart sounds.    Pulmonary/Chest: Effort normal. She has rales.   Abdominal: Soft. Bowel sounds are normal.   Musculoskeletal: She exhibits no edema.   Neurological: She is alert and oriented to person, place, and time.       RESULTS   Results Review:      Results from last 7 days  Lab Units 07/08/17  0901 07/08/17  0410 07/07/17  1516  07/07/17  0356  07/06/17  1311  07/06/17  0620   SODIUM mmol/L  --  137  --   --  135*  --  132*  --  130*   SODIUM, ARTERIAL mmol/L 135.4*  --  135.4*  < >  --   --   --   < >  --    POTASSIUM mmol/L  --  5.2*  --   --  4.9  --  4.6  --  4.1   CHLORIDE mmol/L  --  98  --   --  98  --  94*  --  94*   CO2 mmol/L  --  20.0*  --   --  19.0*  --  21.0*  --  20.0*   BUN mg/dL  --  55*  --   --  41*  --  33*  --  30*   CREATININE mg/dL  --  5.46*  --   --  3.36*  --  1.84*  --  1.78*   CALCIUM mg/dL  --  7.6*  --   --  8.2*  --  8.1*  --  9.0   BILIRUBIN mg/dL  --  5.2*  --   --  3.6*  --   --   --  2.4*   ALK PHOS U/L  --  307*  --   --  189*  --   --   --  265*   ALT (SGPT) U/L  --  110*  --   --  97*  --   --   --  90*   AST (SGOT) U/L  --  268*  --   --  213*  --   --   --  136*   GLUCOSE mg/dL  --  124*  --   --  185*  < > 588*  --  623*   GLUCOSE, ARTERIAL mmol/L 134  --  107  < >  --   --   --   < >  --    < > = values in this interval not displayed.    Estimated " Creatinine Clearance: 19.8 mL/min (by C-G formula based on Cr of 5.46).                  Results from last 7 days  Lab Units 07/08/17  0410 07/07/17  0356 07/06/17  0701   WBC 10*3/mm3 10.81* 17.96* 4.25   HEMOGLOBIN g/dL 12.5 15.4 13.4   PLATELETS 10*3/mm3 147* 237 164              MEDICATIONS      artificial tears  Both Eyes Q4H   chlorhexidine 15 mL Mouth/Throat Q12H   famotidine 20 mg Intravenous BID   heparin (porcine) 5,000 Units Subcutaneous Q12H   insulin aspart 0-9 Units Subcutaneous 4x Daily AC & at Bedtime   [START ON 7/9/2017] levoFLOXacin 250 mg Intravenous Q48H   levothyroxine 300 mcg Oral Daily   methylPREDNISolone sodium succinate 60 mg Intravenous Q8H   nystatin  Topical Q12H       insulin regular infusion 1 unit/mL (CCU use) 10 Units/hr Last Rate: 7.2 Units/hr (07/08/17 0629)   midazolam 1-10 mg/hr Last Rate: Stopped (07/07/17 0049)   norepinephrine 0.02-0.3 mcg/kg/min    propofol 5-80 mcg/kg/min Last Rate: 40 mcg/kg/min (07/07/17 0456)   sodium bicarbonate drip (greater than 75 mEq/bag) 125 mL/hr Last Rate: 125 mL/hr (07/08/17 0629)   vecuronium (NORCURON) infusion 0.6 mcg/kg/min      Prescriptions Prior to Admission   Medication Sig Dispense Refill Last Dose   • atorvastatin (LIPITOR) 20 MG tablet Take 20 mg by mouth Daily.      • fluconazole (DIFLUCAN) 150 MG tablet Take it after completion of course of antibiotics 1 tablet 0    • gabapentin (NEURONTIN) 600 MG tablet Take 600 mg by mouth 3 (Three) Times a Day.      • HYDROcodone-acetaminophen (NORCO) 7.5-325 MG per tablet Take 1 tablet by mouth Every 6 (Six) Hours As Needed for Moderate Pain (4-6). 15 tablet 0    • insulin detemir (LEVEMIR) 100 UNIT/ML injection Inject 55 Units under the skin Every Night.      • levothyroxine (SYNTHROID, LEVOTHROID) 300 MCG tablet Take 300 mcg by mouth Daily.      • losartan (COZAAR) 25 MG tablet Take 25 mg by mouth Daily.      • Multiple Vitamins-Minerals (MULTIVITAMIN WITH MINERALS) tablet tablet Take 1 tablet  by mouth Daily.      • naproxen (NAPROSYN) 500 MG tablet Take 1 tablet by mouth 2 (Two) Times a Day With Meals. 10 tablet 0    • phenazopyridine (PYRIDIUM) 100 MG tablet Take 1 tablet by mouth 3 (Three) Times a Day As Needed for bladder spasms. 20 tablet 0    • QUEtiapine XR (SEROquel XR) 300 MG 24 hr tablet Take 300 mg by mouth 2 (Two) Times a Day.      • sulfamethoxazole-trimethoprim (BACTRIM DS,SEPTRA DS) 800-160 MG per tablet Take 1 tablet by mouth 2 (Two) Times a Day. 20 tablet 0      Assessment/Plan   ASSESSMENT / PLAN    Principal Problem:    Acute respiratory failure with hypoxia and hypercapnia  Active Problems:    Morbid obesity    Obstructive apnea    CKD (chronic kidney disease) stage 3, GFR 30-59 ml/min    Diabetes mellitus type 2, uncontrolled    Abnormal LFTs    Bilateral pneumonia    ARDS (adult respiratory distress syndrome)    Pulmonary hypertension    Legionella infection    UTI (urinary tract infection)    Sepsis    Acute respiratory acidosis    Metabolic acidosis    1.acute kidney injury.  She likely has ATN in the setting of possible sepsis hypotension.  She has mildly elevated potassium.  I think she needs hemodialysis today and I have talked to her sister and she is agreeable with the plan.  Talked to Dr. Mendiola to place a temporary catheter.  Her next dialysis is likely on Monday.  We will try to do 3 and half hours today.  We'll avoid any ultrafiltration.    2.pneumonia with respiratory failure.  Patient also has morbid obesity and possible hyperventilation syndrome.  She has Legionella antigen positive in the urine.  Patient is currently intubated.  She is off sedation and not responding to the brainstem reflexes. Her blood and sputum cultures are negative so far    3.diabetes mellitus type 2    4.hypertension.  Now low. Not requiring any pressors at present    Thank you Dr. Lew for the referral will continue follow the patient during the hospital stay.         I discussed the  patients findings and my recommendations with family and nursing staff         This document has been electronically signed by Olya Singh MD on July 8, 2017 11:27 AM

## 2017-07-08 NOTE — PLAN OF CARE
Problem: Patient Care Overview (Adult)  Goal: Plan of Care Review  Outcome: Ongoing (interventions implemented as appropriate)    07/08/17 0645   Coping/Psychosocial Response Interventions   Plan Of Care Reviewed With patient   Patient Care Overview   Progress no change   Outcome Evaluation   Outcome Summary/Follow up Plan Pt remains on ventilator with no sedation. O2 sat is 85% on 100% O2. Pt withdraws from painful stimuli but is uanble to follow commands. Bicarb amp given, bicarb gtt started. 20ml of urine output during the night. Insulin gtt at 7.2.        Goal: Adult Individualization and Mutuality  Outcome: Ongoing (interventions implemented as appropriate)  Goal: Discharge Needs Assessment  Outcome: Ongoing (interventions implemented as appropriate)    Problem: Respiratory Insufficiency (Adult)  Goal: Identify Related Risk Factors and Signs and Symptoms  Outcome: Ongoing (interventions implemented as appropriate)  Goal: Acid/Base Balance  Outcome: Ongoing (interventions implemented as appropriate)  Goal: Effective Ventilation  Outcome: Ongoing (interventions implemented as appropriate)    Problem: Diabetes, Type 2 (Adult)  Goal: Signs and Symptoms of Listed Potential Problems Will be Absent or Manageable (Diabetes, Type 2)  Outcome: Ongoing (interventions implemented as appropriate)    Problem: Fall Risk (Adult)  Goal: Identify Related Risk Factors and Signs and Symptoms  Outcome: Ongoing (interventions implemented as appropriate)  Goal: Absence of Falls  Outcome: Ongoing (interventions implemented as appropriate)

## 2017-07-08 NOTE — OP NOTE
OPERATIVE NOTE  Lili Méndez  1975  7/8/2017    PREOP DIAGNOSES:  Acute kidney injury requiring short term hemodialysis.    POSTOP DIAGNOSES:   Acute kidney injury requiring short term hemodialysis.    PROCEDURE:   Placement non-tunnelled central venous catheter (13Fr trialysis)  Vascular ultrasound guidance    SURGEON: SEVERO Mendiola MD [facility]     ANESTHESIA: local 1% lidocaine    COMPLICATIONS: none    DESCRIPTION OF OPERATION: In CCU, patient placed in supine position, prepped and draped in sterile fashion.  Vascular ultrasound was used to identify the right common femoral vein which was 10mm in diameter and patent.  Cannulated via modified Seldinger technique with mini stick under ultrasound guidance.  Exchanged for a 0.035 guidewire and serial dilators.  A 13Fr Trialysis catheter was placed, aspirated and flushed without difficulty with Hep-Lock solution.  1000U/mL heparin instilled into lumens. catheter secured to the skin with 2-0 nylon suture.  Sterile dressing applied. patient tolerated procedure well. dialysis notified.             This document has been electronically signed by Damian Mendiola MD on July 8, 2017 9:57 AM

## 2017-07-08 NOTE — PROGRESS NOTES
Progress Note  William Lew MD  Hospitalist     LOS: 2 days   Patient Care Team:  No Known Provider as PCP - General    Chief Complaint: severe dyspnea    Subjective     Interval History:     Patient Complaints: severe shortness of breath / required intubation and mechanical ventilation    History taken from: patient / chart    Medication Review:   Current Facility-Administered Medications   Medication Dose Route Frequency Provider Last Rate Last Dose   • acetaminophen (TYLENOL) 160 MG/5ML solution 650 mg  650 mg Oral Q6H PRN Shant Shin MD       • artificial tears ophthalmic ointment   Both Eyes Q4H William Lew MD       • chlorhexidine (PERIDEX) 0.12 % solution 15 mL  15 mL Mouth/Throat Q12H William Lew MD   15 mL at 07/08/17 1047   • dextrose (D50W) solution 25 g  25 g Intravenous Q15 Min PRN William Lew MD       • dextrose (GLUTOSE) oral gel 15 g  15 g Oral Q15 Min PRN William Lew MD       • famotidine (PEPCID) injection 20 mg  20 mg Intravenous BID William Lew MD   20 mg at 07/08/17 1047   • fentaNYL citrate (PF) (SUBLIMAZE) injection 25 mcg  25 mcg Intravenous Q1H PRN Thao Broussard MD       • glucagon (human recombinant) (GLUCAGEN DIAGNOSTIC) injection 1 mg  1 mg Subcutaneous Q15 Min PRN William Lew MD       • heparin (porcine) 5000 UNIT/ML injection 5,000 Units  5,000 Units Subcutaneous Q12H William Lew MD   5,000 Units at 07/08/17 1047   • heparin (porcine) injection 3,000 Units  3,000 Units Intracatheter PRN Olya Singh MD       • insulin aspart (novoLOG) injection 0-9 Units  0-9 Units Subcutaneous 4x Daily AC & at Bedtime William Lew MD       • insulin regular (HumuLIN R,NovoLIN R) 100 Units in sodium chloride 0.9 % 100 mL (1 Units/mL) infusion  10 Units/hr Intravenous Titrated William Lew MD 2.7 mL/hr at 07/08/17 1525 2.7 Units/hr at 07/08/17 1525   • [START ON 7/9/2017] levoFLOXacin (LEVAQUIN) 250 mg/50 mL D5W (premix) 250 mg  250 mg Intravenous Q48H William Lew,  MD       • levothyroxine (SYNTHROID, LEVOTHROID) tablet 300 mcg  300 mcg Oral Daily William Lew MD   300 mcg at 07/08/17 1047   • lidocaine-prilocaine (EMLA) 2.5-2.5 % cream   Topical PRN Olya Singh MD       • methylPREDNISolone sodium succinate (SOLU-Medrol) injection 60 mg  60 mg Intravenous Q8H Thao Broussard MD   60 mg at 07/08/17 1250   • midazolam (VERSED) 50 mg in sodium chloride 0.9 % 100 mL (0.5 mg/mL) infusion  1-10 mg/hr Intravenous Titrated Thao Broussard MD   Stopped at 07/07/17 0049   • midazolam (VERSED) injection 1 mg  1 mg Intravenous Q1H PRN Thao Broussard MD       • norepinephrine (LEVOPHED) 8,000 mcg in sodium chloride 0.9 % 250 mL (32 mcg/mL) infusion  0.02-0.3 mcg/kg/min Intravenous Titrated Thao Broussard MD       • nystatin (MYCOSTATIN) powder   Topical Q12H William Lew MD       • propofol (DIPRIVAN) infusion 10 mg/mL 100 mL  5-80 mcg/kg/min Intravenous Titrated Thao Broussard MD 32.9 mL/hr at 07/07/17 0456 40 mcg/kg/min at 07/07/17 0456   • sodium bicarbonate 8.4 % 150 mEq in dextrose (D5W) 5 % 1,000 mL infusion (greater than 75 mEq)  125 mL/hr Intravenous Continuous Olya Singh  mL/hr at 07/08/17 0629 125 mL/hr at 07/08/17 0629   • sodium chloride 0.9 % bolus 1,000 mL  1,000 mL Intravenous PRN Olya Singh MD       • sodium chloride 0.9 % flush 1-10 mL  1-10 mL Intravenous PRN William Lew MD       • sodium chloride 0.9 % flush 10 mL  10 mL Intravenous PRN Daniel Lunsford MD   10 mL at 07/06/17 1702   • vecuronium (NORCURON) 100 mg in sodium chloride 0.9 % 100 mL (1 mg/mL) infusion  0.6 mcg/kg/min Intravenous Titrated Shant Shin MD       • vecuronium (NORCURON) bolus from bag 1 mg/mL 6.85 mg  50 mcg/kg Intravenous Once PRN William Lew MD           Review of Systems:   Review of Systems   Unable to perform ROS: Intubated       Objective     Vital Signs  Temp:  [98.2 °F (36.8 °C)-99.2 °F (37.3 °C)] 98.2 °F (36.8 °C)  Heart Rate:  [93-97] 93  Resp:  [35]  35  BP: (102-122)/(55-68) 109/62  FiO2 (%):  [100 %] 100 %    Physical Exam:  Physical Exam   Constitutional:   Morbidly obese   HENT:   Head: Normocephalic and atraumatic.   Eyes: EOM are normal. Pupils are equal, round, and reactive to light. No scleral icterus.   Neck: Normal range of motion. Neck supple.   Cardiovascular: Regular rhythm.  Tachycardia present.    No murmur heard.  Pulmonary/Chest: She has wheezes. She has rales.   Abdominal: Soft. Bowel sounds are normal. She exhibits no distension. There is no tenderness. There is no guarding.   Musculoskeletal: She exhibits no edema or tenderness.   Neurological:   Sedated / intubated   Skin: Skin is warm and dry.   Vitals reviewed.       Results Review:    Lab Results (last 24 hours)     Procedure Component Value Units Date/Time    Sodium, Urine, Random [[id number]]  (Normal) Collected:  07/07/17 2056    Specimen:  Urine from Urine, Clean Catch Updated:  07/07/17 2213     Sodium, Urine 83 mmol/L     Creatinine, Urine, Random [[id number]] Collected:  07/07/17 2056    Specimen:  Urine from Urine, Clean Catch Updated:  07/07/17 2213     Creatinine, Urine 103.3 mg/dL     POC Glucose Fingerstick [[id number]]  (Abnormal) Collected:  07/07/17 0346    Specimen:  Blood Updated:  07/07/17 2257     Glucose 189 (H) mg/dL       Meter: [id number]: [id number] LEANDRA CAHLE       POC Glucose Fingerstick [[id number]]  (Abnormal) Collected:  07/07/17 0444    Specimen:  Blood Updated:  07/07/17 2257     Glucose 185 (H) mg/dL       Meter: [id number]: [id number] LEANDRA CAHLE       POC Glucose Fingerstick [[id number]]  (Abnormal) Collected:  07/07/17 0543    Specimen:  Blood Updated:  07/07/17 2257     Glucose 176 (H) mg/dL       Meter: [id number]: [id number] LEANDRA CAHLE       POC Glucose Fingerstick [[id number]]  (Normal) Collected:  07/07/17 0646    Specimen:  Blood Updated:  07/07/17 2258     Glucose 128 mg/dL       Meter: [id number]:  [id number] [facility]       POC Glucose Fingerstick [[id number]]  (Normal) Collected:  07/07/17 0739    Specimen:  Blood Updated:  07/07/17 2258     Glucose 80 mg/dL       Meter: [id number]: [id number] TOBIAS EVANSNAH       POC Glucose Fingerstick [[id number]]  (Normal) Collected:  07/07/17 0836    Specimen:  Blood Updated:  07/07/17 2258     Glucose 88 mg/dL       Meter: [id number]: [id number] TOBIAS YAZAN       POC Glucose Fingerstick [[id number]]  (Normal) Collected:  07/07/17 0936    Specimen:  Blood Updated:  07/07/17 2258     Glucose 115 mg/dL       Meter: [id number]: [id number] TOBIAS EVANSNAH       POC Glucose Fingerstick [[id number]]  (Normal) Collected:  07/07/17 1018    Specimen:  Blood Updated:  07/07/17 2258     Glucose 113 mg/dL       Meter: [id number]: [id number] TOBIAS YAZAN       POC Glucose Fingerstick [[id number]]  (Normal) Collected:  07/07/17 1118    Specimen:  Blood Updated:  07/07/17 2258     Glucose 94 mg/dL       Meter: [id number]: [id number] TOBIAS EVANSNAH       POC Glucose Fingerstick [[id number]]  (Normal) Collected:  07/07/17 1226    Specimen:  Blood Updated:  07/07/17 2259     Glucose 114 mg/dL       Meter: [id number]: [id number] TOBIAS YAZAN       POC Glucose Fingerstick [[id number]]  (Normal) Collected:  07/07/17 1334    Specimen:  Blood Updated:  07/07/17 2259     Glucose 110 mg/dL       Meter: [id number]: [id number] TOBIAS YAZAN       POC Glucose Fingerstick [[id number]]  (Normal) Collected:  07/07/17 1507    Specimen:  Blood Updated:  07/07/17 2259     Glucose 98 mg/dL       Meter: [id number]: [id number] TOBIAS YAZAN       POC Glucose Fingerstick [[id number]]  (Normal) Collected:  07/07/17 1612    Specimen:  Blood Updated:  07/07/17 2259     Glucose 104 mg/dL       Meter: [id number]: [id number] TOBIAS HAND       POC Glucose Fingerstick [[id number]]  (Abnormal) Collected:  07/07/17 0063     Specimen:  Blood Updated:  07/07/17 2259     Glucose 132 (H) mg/dL       Meter: [id number]: [id number] TOBIAS HAND       POC Glucose Fingerstick [[id number]]  (Normal) Collected:  07/07/17 1821    Specimen:  Blood Updated:  07/07/17 2300     Glucose 125 mg/dL       Meter: [id number]: [id number] TOBIAS EVANSNAH       POC Glucose Fingerstick [[id number]]  (Normal) Collected:  07/07/17 2133    Specimen:  Blood Updated:  07/07/17 2300     Glucose 119 mg/dL       RN NotifiedMeter: [id number]: [id number] CLAUDIA DC       POC Glucose Fingerstick [[id number]]  (Normal) Collected:  07/07/17 2030    Specimen:  Blood Updated:  07/07/17 2301     Glucose 106 mg/dL       Meter: [id number]: [id number] FARRAH YANGIN       POC Glucose Fingerstick [[id number]]  (Normal) Collected:  07/07/17 2252    Specimen:  Blood Updated:  07/08/17 0210     Glucose 121 mg/dL       RN NotifiedMeter: [id number]: [id number] CLAUDIA DC       POC Glucose Fingerstick [[id number]]  (Normal) Collected:  07/07/17 2332    Specimen:  Blood Updated:  07/08/17 0211     Glucose 112 mg/dL       RN NotifiedMeter: [id number]: [id number] CLAUDIA DC       POC Glucose Fingerstick [[id number]]  (Normal) Collected:  07/08/17 0059    Specimen:  Blood Updated:  07/08/17 0211     Glucose 118 mg/dL       RN NotifiedMeter: [id number]: [id number] CLAUDIA DC       POC Glucose Fingerstick [[id number]]  (Normal) Collected:  07/08/17 0158    Specimen:  Blood Updated:  07/08/17 0211     Glucose 117 mg/dL       RN NotifiedMeter: [id number]: [id number] TONY FAWN       CBC Auto Differential [[id number]]  (Abnormal) Collected:  07/08/17 0410    Specimen:  Blood Updated:  07/08/17 0429     WBC 10.81 (H) 10*3/mm3      RBC 4.04 10*6/mm3      Hemoglobin 12.5 g/dL      Hematocrit 36.1 %      MCV 89.4 fL      MCH 30.9 pg      MCHC 34.6 g/dL      RDW 14.7 (H) %      RDW-SD 48.6 (H) fl      MPV 10.7 fL       Platelets 147 (L) 10*3/mm3      Neutrophil % 84.8 (H) %      Lymphocyte % 4.0 (L) %      Monocyte % 2.9 %      Eosinophil % 0.0 %      Basophil % 0.2 %      Immature Grans % 8.1 (H) %      Neutrophils, Absolute 9.17 (H) 10*3/mm3      Lymphocytes, Absolute 0.43 (L) 10*3/mm3      Monocytes, Absolute 0.31 10*3/mm3      Eosinophils, Absolute 0.00 10*3/mm3      Basophils, Absolute 0.02 10*3/mm3      Immature Grans, Absolute 0.88 (H) 10*3/mm3     CBC & Differential [[id number]] Collected:  07/08/17 0410    Specimen:  Blood Updated:  07/08/17 0429    Narrative:       The following orders were created for panel order CBC & Differential.  Procedure                               Abnormality         Status                     ---------                               -----------         ------                     Scan Slide[[id number]]                                                                  CBC Auto Differential[[id number]]        Abnormal            Final result                 Please view results for these tests on the individual orders.    Comprehensive Metabolic Panel [[id number]]  (Abnormal) Collected:  07/08/17 0410    Specimen:  Blood Updated:  07/08/17 0454     Glucose 124 (H) mg/dL      BUN 55 (H) mg/dL      Creatinine 5.46 (H) mg/dL      Sodium 137 mmol/L      Potassium 5.2 (H) mmol/L      Chloride 98 mmol/L      CO2 20.0 (L) mmol/L      Calcium 7.6 (L) mg/dL      Total Protein 7.7 g/dL      Albumin 3.40 g/dL      ALT (SGPT) 110 (H) U/L      AST (SGOT) 268 (H) U/L      Alkaline Phosphatase 307 (H) U/L      Total Bilirubin 5.2 (H) mg/dL      eGFR Non African Amer 9 (L) mL/min/1.73      Globulin 4.3 (H) gm/dL      A/G Ratio 0.8 (L) g/dL      BUN/Creatinine Ratio 10.1     Anion Gap 19.0 (H) mmol/L     Urine Culture [[id number]]  (Normal) Collected:  07/06/17 1619    Specimen:  Urine from Urine, Catheter Updated:  07/08/17 0623     Urine Culture No growth at 24 hours    Blood Culture [[id number]]  (Normal) Collected:   07/06/17 0701    Specimen:  Blood from Arm, Left Updated:  07/08/17 0801     Blood Culture No growth at 2 days    Blood Gas, Arterial [[id number]]  (Abnormal) Collected:  07/08/17 0901    Specimen:  Arterial Blood Updated:  07/08/17 0903     Site --      Not performed at this site.        Bennett's Test --      Not performed at this site.        pH, Arterial 7.124 (L) pH units      pCO2, Arterial 67.7 (H) mm Hg      pO2, Arterial 61.2 (L) mm Hg      HCO3, Arterial 21.7 (L) mmol/L      Base Excess, Arterial -8.5 (L) mmol/L      O2 Saturation, Arterial 87.7 (L) %      Hemoglobin, Blood Gas 13.2 g/dL      Hematocrit, Blood Gas 39.0 %      CO2 Content 23.8     Sodium, Arterial 135.4 (L) mmol/L      Potassium, Arterial 4.87 mmol/L      Glucose, Arterial 134 mmol/L      Barometric Pressure for Blood Gas -- mmHg       Not performed at this site.        Modality --      Not performed at this site.        Ionized Calcium 4.2 (L) mg/dL     Respiratory Culture [[id number]] Collected:  07/06/17 2243    Specimen:  Sputum from ET Suction Updated:  07/08/17 0906     Respiratory Culture No growth at 24 hours     Gram Stain Result Rare (1+) WBCs seen      No Epithelial cells seen      Mixed bacterial madonna    Blood Culture [[id number]]  (Normal) Collected:  07/06/17 0906    Specimen:  Blood from Hand, Right Updated:  07/08/17 1001     Blood Culture No growth at 2 days    POC Glucose Fingerstick [[id number]]  (Normal) Collected:  07/08/17 0401    Specimen:  Blood Updated:  07/08/17 1022     Glucose 118 mg/dL       RN NotifiedMeter: [id number]: [id number] CLAUDIA DC       POC Glucose Fingerstick [[id number]]  (Abnormal) Collected:  07/08/17 0625    Specimen:  Blood Updated:  07/08/17 1022     Glucose 144 (H) mg/dL       RN NotifiedMeter: [id number]: [id number] CLAUDIA DC       POC Glucose Fingerstick [[id number]]  (Normal) Collected:  07/08/17 0758    Specimen:  Blood Updated:  07/08/17 1022     Glucose 129 mg/dL        Meter: [id number]: [id number] INDIRA BEJARANO       POC Glucose Fingerstick [[id number]]  (Normal) Collected:  07/08/17 1009    Specimen:  Blood Updated:  07/08/17 1022     Glucose 121 mg/dL       RN NotifiedMeter: [id number]: [id number] MATEO PEREZ       Hepatitis B Surface Antigen [[id number]] Collected:  07/06/17 1558    Specimen:  Blood Updated:  07/08/17 1050          Imaging Results (last 24 hours)     Procedure Component Value Units Date/Time    XR Chest 1 View [[id number]] Collected:  07/08/17 0603     Updated:  07/08/17 0620    Narrative:         Chest single view on  7/8/2017     CLINICAL INDICATION: Intubated    COMPARISON: 7/6/2017    FINDINGS: ET tube tip is in the mid thoracic trachea. NG tube  extends below the diaphragm. Multiple overlying wires are noted.  Right-sided PICC line tip is in the SVC. There has been slight  improvement in extensive bilateral opacities consistent with  slight improved pneumonia. There is a probable small right  pleural effusion. Heart is borderline in size.      Impression:       Likely slight improvement in extensive bilateral  pneumonia.    Electronically signed by:  Jorge Leary  7/8/2017 6:18 AM CDT  Workstation: [id number]    US Guided Vascular Access [[id number]] Resulted:  07/08/17 1000     Updated:  07/08/17 1000    Narrative:       This procedure was auto-finalized with no dictation required.    IR insert non-tunneled CVC 5+ [[id number]] Resulted:  07/08/17 1007     Updated:  07/08/17 1007          Assessment/Plan     Principal Problem:    Acute respiratory failure with hypoxia and hypercapnia  Active Problems:    Diabetes mellitus type 2, uncontrolled    Bilateral pneumonia    ARDS (adult respiratory distress syndrome)    Sepsis    Acute respiratory acidosis    Metabolic acidosis    Anuria    Acute renal failure    Morbid obesity    Obstructive apnea    Abnormal LFTs    Pulmonary hypertension    Legionella infection    UTI (urinary  tract infection)    Severe respiratory acidosis / superimposed metabolic acidosis due to above. Her pH / bicarbonate have improved some (7.12 / 20) but she is currently oliguric / anuric, requiring dialysis at present.    Continue with the maximal ventilatory support (AC 35/min, FiO2 of 100 %, PEEP of 18), IV fluid, insulin, IV antibiotics, IV pressors if needed. She remains hypoxic and hypercapnic (pCO2 67 / pO2 of 61 on these vent settings).    William Lew MD  07/08/17  4:55 PM

## 2017-07-08 NOTE — PLAN OF CARE
Problem: Patient Care Overview (Adult)  Goal: Plan of Care Review    07/08/17 9790   Outcome Evaluation   Outcome Summary/Follow up Plan GRICELDA called and pt ruled out for donation, instructed to call if pt expires.

## 2017-07-08 NOTE — PLAN OF CARE
Problem: Respiratory Insufficiency (Adult)  Goal: Acid/Base Balance  Outcome: Ongoing (interventions implemented as appropriate)  Patient still severely acidotic.  Goal: Effective Ventilation  Outcome: Ongoing (interventions implemented as appropriate)  Little if any progress in ventilating this patient. Still on high settings.

## 2017-07-08 NOTE — PROGRESS NOTES
Ventilator follow-up:    PO2 61 PCO2 68 pH 7.12 on assist control tidal volume 330, respiratory rate 35, 100% oxygen, PEEP 18.  Vital signs okay.    Impression:  Acute hypoxemic and hypercarbic respiratory failure  ARDS    Recommendations:  Continue present settings, monitor arterial blood gas.

## 2017-07-09 NOTE — PROGRESS NOTES
Progress Note  William Lew MD  Hospitalist     LOS: 3 days   Patient Care Team:  No Known Provider as PCP - General    Chief Complaint: severe dyspnea    Subjective     Interval History:     Patient Complaints: severe shortness of breath / required intubation and mechanical ventilation    History taken from: patient / chart    Medication Review:   Current Facility-Administered Medications   Medication Dose Route Frequency Provider Last Rate Last Dose   • acetaminophen (TYLENOL) 160 MG/5ML solution 650 mg  650 mg Oral Q6H PRN Shant Shin MD       • albuterol (PROVENTIL HFA;VENTOLIN HFA) inhaler 4 puff  4 puff Inhalation Q4H - RT Damian Raymond MD       • artificial tears ophthalmic ointment   Both Eyes Q4H William Lew MD       • chlorhexidine (PERIDEX) 0.12 % solution 15 mL  15 mL Mouth/Throat Q12H William Lew MD   15 mL at 07/09/17 0837   • dextrose (D50W) solution 25 g  25 g Intravenous Q15 Min PRN William Lew MD   5 g at 07/09/17 1124   • dextrose (GLUTOSE) oral gel 15 g  15 g Oral Q15 Min PRN William Lew MD       • famotidine (PEPCID) injection 20 mg  20 mg Intravenous BID William Lew MD   20 mg at 07/09/17 0837   • fentaNYL citrate (PF) (SUBLIMAZE) injection 25 mcg  25 mcg Intravenous Q1H PRN Thao Broussard MD   25 mcg at 07/08/17 2130   • glucagon (human recombinant) (GLUCAGEN DIAGNOSTIC) injection 1 mg  1 mg Subcutaneous Q15 Min PRN William Lew MD       • heparin (porcine) 5000 UNIT/ML injection 5,000 Units  5,000 Units Subcutaneous Q12H William Lew MD   5,000 Units at 07/09/17 0846   • heparin (porcine) injection 3,000 Units  3,000 Units Intracatheter PRN Olya Singh MD       • insulin aspart (novoLOG) injection 0-9 Units  0-9 Units Subcutaneous 4x Daily AC & at Bedtime William Lew MD       • insulin regular (HumuLIN R,NovoLIN R) 100 Units in sodium chloride 0.9 % 100 mL (1 Units/mL) infusion  10 Units/hr Intravenous Titrated William Lew MD 1.8 mL/hr at 07/09/17 1127  1.8 Units/hr at 07/09/17 1120   • ipratropium (ATROVENT HFA) inhaler 4 puff  4 puff Inhalation Q4H - RT Damian Raymond MD       • levoFLOXacin (LEVAQUIN) 250 mg/50 mL D5W (premix) 250 mg  250 mg Intravenous Q48H William Lew MD   250 mg at 07/09/17 0604   • levothyroxine (SYNTHROID, LEVOTHROID) tablet 300 mcg  300 mcg Oral Daily William Lew MD   300 mcg at 07/09/17 0837   • lidocaine-prilocaine (EMLA) 2.5-2.5 % cream   Topical PRN Olya Singh MD       • methylPREDNISolone sodium succinate (SOLU-Medrol) injection 60 mg  60 mg Intravenous Q8H Thao Broussard MD   60 mg at 07/09/17 0846   • midazolam (VERSED) 50 mg in sodium chloride 0.9 % 100 mL (0.5 mg/mL) infusion  1-10 mg/hr Intravenous Titrated Thao Broussard MD   Stopped at 07/07/17 0049   • midazolam (VERSED) injection 1 mg  1 mg Intravenous Q1H PRN Thao Broussard MD   1 mg at 07/08/17 2234   • norepinephrine (LEVOPHED) 8,000 mcg in sodium chloride 0.9 % 250 mL (32 mcg/mL) infusion  0.02-0.3 mcg/kg/min Intravenous Titrated Thao Broussard MD       • norepinephrine (LEVOPHED) 8,000 mcg in sodium chloride 0.9 % 250 mL (32 mcg/mL) infusion  0.02-0.3 mcg/kg/min Intravenous Titrated Shant Shin MD   Stopped at 07/08/17 2315   • nystatin (MYCOSTATIN) powder   Topical Q12H William Lew MD       • propofol (DIPRIVAN) infusion 10 mg/mL 100 mL  5-80 mcg/kg/min Intravenous Titrated Thao Broussard MD 32.9 mL/hr at 07/07/17 0456 40 mcg/kg/min at 07/07/17 0456   • sodium chloride 0.9 % bolus 1,000 mL  1,000 mL Intravenous PRN Shant Shin MD       • sodium chloride 0.9 % flush 1-10 mL  1-10 mL Intravenous PRN William Lew MD       • sodium chloride 0.9 % flush 10 mL  10 mL Intravenous PRN Daniel Lunsford MD   10 mL at 07/06/17 1702   • sodium chloride 0.9 % infusion  60 mL/hr Intravenous Continuous Olya Singh MD 60 mL/hr at 07/09/17 1120 60 mL/hr at 07/09/17 1120   • vecuronium (NORCURON) 100 mg in sodium chloride 0.9 % 100 mL (1 mg/mL) infusion   0.6 mcg/kg/min Intravenous Titrated Shant Shin MD       • vecuronium (NORCURON) bolus from bag 1 mg/mL 6.85 mg  50 mcg/kg Intravenous Once PRN William Lew MD           Review of Systems:   Review of Systems   Unable to perform ROS: Intubated       Objective     Vital Signs  Temp:  [97.2 °F (36.2 °C)-99.1 °F (37.3 °C)] 97.2 °F (36.2 °C)  Heart Rate:  [] 95  Resp:  [24-37] 24  BP: ()/(44-85) 114/64  FiO2 (%):  [100 %] 100 %    Physical Exam:  Physical Exam   Constitutional:   Morbidly obese   HENT:   Head: Normocephalic and atraumatic.   Eyes: EOM are normal. Pupils are equal, round, and reactive to light. No scleral icterus.   Neck: Normal range of motion. Neck supple.   Cardiovascular: Regular rhythm.  Tachycardia present.    No murmur heard.  Pulmonary/Chest: She has wheezes. She has rales.   Abdominal: Soft. Bowel sounds are normal. She exhibits no distension. There is no tenderness. There is no guarding.   Musculoskeletal: She exhibits no edema or tenderness.   Neurological:   Sedated / intubated   Skin: Skin is warm and dry.   Vitals reviewed.       Results Review:    Lab Results (last 24 hours)     Procedure Component Value Units Date/Time    Blood Gas, Arterial [[id number]]  (Abnormal) Collected:  07/08/17 2158    Specimen:  Arterial Blood Updated:  07/08/17 2203     Site --      Not performed at this site.        Bennett's Test --      Not performed at this site.        pH, Arterial 7.379 pH units      pCO2, Arterial 52.4 (H) mm Hg      pO2, Arterial 49.4 (L) mm Hg      HCO3, Arterial 30.2 (H) mmol/L      Base Excess, Arterial 4.1 (H) mmol/L      O2 Saturation, Arterial 85.1 %      Hemoglobin, Blood Gas 12.2 g/dL      Hematocrit, Blood Gas 36.0 (L) %      CO2 Content 31.8 (H)     Sodium, Arterial 131.3 (L) mmol/L      Potassium, Arterial 3.65 mmol/L      Glucose, Arterial 145 mmol/L      Barometric Pressure for Blood Gas -- mmHg       Not performed at this site.        Modality --      Not  performed at this site.        Ionized Calcium 3.8 (L) mg/dL     POC Glucose Fingerstick [[id number]]  (Normal) Collected:  07/08/17 1144    Specimen:  Blood Updated:  07/09/17 0059     Glucose 106 mg/dL       Meter: [id number]: [id number] INDIRA [facility]       POC Glucose Fingerstick [[id number]]  (Normal) Collected:  07/08/17 1252    Specimen:  Blood Updated:  07/09/17 0100     Glucose 120 mg/dL       Meter: [id number]: [id number] INDIRA [facility]       POC Glucose Fingerstick [[id number]]  (Normal) Collected:  07/08/17 1505    Specimen:  Blood Updated:  07/09/17 0100     Glucose 104 mg/dL       Meter: [id number]: [id number] INDIRA JEDADIAH       POC Glucose Fingerstick [[id number]]  (Normal) Collected:  07/08/17 1607    Specimen:  Blood Updated:  07/09/17 0100     Glucose 111 mg/dL       Meter: [id number]: [id number] INDIRA JEDADIAH       POC Glucose Fingerstick [[id number]]  (Abnormal) Collected:  07/08/17 1840    Specimen:  Blood Updated:  07/09/17 0100     Glucose 145 (H) mg/dL       Meter: [id number]: [id number] KAYLA CRAWFORDCI       POC Glucose Fingerstick [[id number]]  (Abnormal) Collected:  07/08/17 1935    Specimen:  Blood Updated:  07/09/17 0100     Glucose 146 (H) mg/dL       RN NotifiedMeter: [id number]: [id number] CLAUDIA DC       POC Glucose Fingerstick [[id number]]  (Abnormal) Collected:  07/08/17 2034    Specimen:  Blood Updated:  07/09/17 0140     Glucose 134 (H) mg/dL       RN NotifiedMeter: [id number]: [id number] CLAUDIA DC       POC Glucose Fingerstick [[id number]]  (Normal) Collected:  07/08/17 2130    Specimen:  Blood Updated:  07/09/17 0140     Glucose 127 mg/dL       RN NotifiedMeter: [id number]: [id number] CLAUDIA IRWIN       POC Glucose Fingerstick [[id number]]  (Normal) Collected:  07/08/17 2228    Specimen:  Blood Updated:  07/09/17 0140     Glucose 120 mg/dL       RN NotifiedMeter: [id number]: [id number] CLAUDIA  DC       POC Glucose Fingerstick [[id number]]  (Abnormal) Collected:  07/08/17 2337    Specimen:  Blood Updated:  07/09/17 0140     Glucose 144 (H) mg/dL       RN NotifiedMeter: [id number]: [id number] CLAUDIA IRWIN       POC Glucose Fingerstick [[id number]]  (Abnormal) Collected:  07/09/17 0123    Specimen:  Blood Updated:  07/09/17 0140     Glucose 147 (H) mg/dL       RN NotifiedMeter: [id number]: [id number] CLAUDIA IRWIN       CBC Auto Differential [[id number]]  (Abnormal) Collected:  07/09/17 0258    Specimen:  Blood Updated:  07/09/17 0324     WBC 16.28 (H) 10*3/mm3      RBC 4.05 10*6/mm3      Hemoglobin 12.1 g/dL      Hematocrit 35.9 %      MCV 88.6 fL      MCH 29.9 pg      MCHC 33.7 g/dL      RDW 15.0 (H) %      RDW-SD 49.1 (H) fl      MPV 10.8 fL      Platelets 158 10*3/mm3      Neutrophil % 88.5 (H) %      Lymphocyte % 3.0 (L) %      Monocyte % 0.4 %      Eosinophil % 0.0 %      Basophil % 0.2 %      Immature Grans % 7.9 (H) %      Neutrophils, Absolute 14.41 (H) 10*3/mm3      Lymphocytes, Absolute 0.49 (L) 10*3/mm3      Monocytes, Absolute 0.07 10*3/mm3      Eosinophils, Absolute 0.00 10*3/mm3      Basophils, Absolute 0.03 10*3/mm3      Immature Grans, Absolute 1.28 (H) 10*3/mm3     CBC & Differential [[id number]] Collected:  07/09/17 0258    Specimen:  Blood Updated:  07/09/17 0326    Narrative:       The following orders were created for panel order CBC & Differential.  Procedure                               Abnormality         Status                     ---------                               -----------         ------                     Scan Slide[[id number]]                                                                  CBC Auto Differential[[id number]]        Abnormal            Final result                 Please view results for these tests on the individual orders.    Comprehensive Metabolic Panel [[id number]]  (Abnormal) Collected:  07/09/17 0258    Specimen:  Blood Updated:   07/09/17 0334     Glucose 143 (H) mg/dL      BUN 37 (H) mg/dL      Creatinine 4.68 (H) mg/dL      Sodium 135 (L) mmol/L      Potassium 3.6 mmol/L      Chloride 87 (L) mmol/L      CO2 33.0 (H) mmol/L      Calcium 7.5 (L) mg/dL      Total Protein 7.5 g/dL      Albumin 3.10 (L) g/dL      ALT (SGPT) 123 (H) U/L      AST (SGOT) 387 (H) U/L      Alkaline Phosphatase 470 (H) U/L      Total Bilirubin 6.1 (H) mg/dL      eGFR Non African Amer 10 (L) mL/min/1.73      Globulin 4.4 (H) gm/dL      A/G Ratio 0.7 (L) g/dL      BUN/Creatinine Ratio 7.9     Anion Gap 15.0 mmol/L     Blood Gas, Arterial [[id number]]  (Abnormal) Collected:  07/09/17 0715    Specimen:  Arterial Blood Updated:  07/09/17 0735     Site --      Not performed at this site.        Bennett's Test --      Not performed at this site.        pH, Arterial 7.146 (L) pH units      pCO2, Arterial 110.4 (H) mm Hg      pO2, Arterial 41.1 (L) mm Hg      HCO3, Arterial 37.2 (H) mmol/L      Base Excess, Arterial 4.7 (H) mmol/L      O2 Saturation, Arterial 69.2 (L) %      Hemoglobin, Blood Gas 13.0 g/dL      Hematocrit, Blood Gas 38.0 %      CO2 Content 40.6 (H)     Sodium, Arterial 132.4 (L) mmol/L      Potassium, Arterial 3.64 mmol/L      Glucose, Arterial 132 mmol/L      Barometric Pressure for Blood Gas -- mmHg       Not performed at this site.        Modality --      Not performed at this site.        Ionized Calcium 4.0 (L) mg/dL     Blood Culture [[id number]]  (Normal) Collected:  07/06/17 0701    Specimen:  Blood from Arm, Left Updated:  07/09/17 0801     Blood Culture No growth at 3 days    Respiratory Culture [[id number]] Collected:  07/06/17 2243    Specimen:  Sputum from ET Suction Updated:  07/09/17 0802     Respiratory Culture Reduced Normal Respiratory Madonna     Gram Stain Result Rare (1+) WBCs seen      No Epithelial cells seen      Mixed bacterial madonna    Blood Culture [[id number]]  (Normal) Collected:  07/06/17 0906    Specimen:  Blood from Hand, Right  Updated:  07/09/17 1001     Blood Culture No growth at 3 days          Imaging Results (last 24 hours)     Procedure Component Value Units Date/Time    IR insert non-tunneled CVC 5+ [[id number]] Resulted:  07/08/17 1720     Updated:  07/08/17 1720    Narrative:       OPERATIVE NOTE  Lili Méndez  1975  7/8/2017     PREOP DIAGNOSES:  Acute kidney injury requiring short term hemodialysis.     POSTOP DIAGNOSES:   Acute kidney injury requiring short term hemodialysis.     PROCEDURE:   Placement non-tunnelled central venous catheter (13Fr trialysis)  Vascular ultrasound guidance     SURGEON: SEVERO Mendiola MD [facility]      ANESTHESIA: local 1% lidocaine     COMPLICATIONS: none     DESCRIPTION OF OPERATION: In CCU, patient placed in supine position,   prepped and draped in sterile fashion. Vascular ultrasound was used to   identify the right common femoral vein which was 10mm in diameter and   patent. Cannulated via modified Seldinger technique with mini stick under   ultrasound guidance. Exchanged for a 0.035 guidewire and serial dilators.   A 13Fr Trialysis catheter was placed, aspirated and flushed without   difficulty with Hep-Lock solution. 1000U/mL heparin instilled into lumens.   catheter secured to the skin with 2-0 nylon suture. Sterile dressing   applied. patient tolerated procedure well. dialysis notified.              This document has been electronically signed by Damian Mendiola MD   on July 8, 2017 9:57 AM     XR Chest 1 View [[id number]] Collected:  07/08/17 2221     Updated:  07/08/17 2236    Narrative:         Chest single view on  7/8/2017     CLINICAL INDICATION: Low oxygen saturation    COMPARISON: 7/8/2017    FINDINGS: ET tube tip is in the midthoracic trachea. NG tube  extends likely into the upper stomach. Right-sided PICC line tip  is in the SVC. New right IJ catheter tip is at the cavoatrial  junction. There is no pneumothorax. A few overlying wires are  noted. Heart is borderline  in size. There remains extensive  bilateral opacities consistent with likely extensive bilateral  pneumonia. Small pleural effusions may be present.      Impression:       New right IJ catheter in place as above with  otherwise no significant change.    Electronically signed by:  Jorge Leary  7/8/2017 10:35 PM  CDT Workstation: [id number]          Assessment/Plan     Principal Problem:    Acute respiratory failure with hypoxia and hypercapnia  Active Problems:    Diabetes mellitus type 2, uncontrolled    Bilateral pneumonia    ARDS (adult respiratory distress syndrome)    Sepsis    Acute respiratory acidosis    Metabolic acidosis    Anuria    Acute renal failure    Morbid obesity    Obstructive apnea    Abnormal LFTs    Pulmonary hypertension    Legionella infection    UTI (urinary tract infection)    Severe respiratory acidosis / superimposed metabolic acidosis due to above. Her pH / bicarbonate have improved some (7.12 / 20) but she is currently oliguric / anuric, requiring dialysis at present.    Continue with the maximal ventilatory support (AC 35/min, FiO2 of 100 %, PEEP of 18), IV fluid, insulin, IV antibiotics, IV pressors if needed. She remains hypoxic and hypercapnic (pCO2 67 / pO2 of 61 on these vent settings).    Discussed with her sisters about the terminal prognosis. The agree with DNR.    William Lew MD  07/09/17  11:31 AM

## 2017-07-09 NOTE — PLAN OF CARE
Problem: Respiratory Insufficiency (Adult)  Goal: Effective Ventilation  Outcome: Ongoing (interventions implemented as appropriate)  Unable to maintain oxygen saturations above 80% at this time. Patient remains on very high vent settings with no improvement. Several interventions were made  in an attempt to improve oxygen saturations and  PIP.

## 2017-07-09 NOTE — PROGRESS NOTES
Ventilator follow-up:    PO2 41 PCO2 110 pH 7.15 on tidal volume 330, PEEP 18, respiratory rate set at 22 because of high pressures.    Impression:  Acute hypoxemic and hypercarbic respiratory failure  ARDS    Recommendations:  Increase respiratory rate to 30  Add albuterol and Atrovent inhalers

## 2017-07-09 NOTE — NURSING NOTE
"This nurse talked with Pt.'s sister, Felix. Felix is the patient's POA and has requested the code word to be changed to \"[employer]\". Vital signs reviewed and questions answered. Felix thanked this nurse and hung up the phone.  "

## 2017-07-09 NOTE — PROGRESS NOTES
"[facility]  [address] [place] [address]. [phone]  T - [phone]  F - [phone]     Progress Note          PATIENT  DEMOGRAPHICS   PATIENT NAME: Lili Méndez                      PHYSICIAN: Olya Singh MD  : 1975  MRN: [id number]   LOS: 3 days    Patient Care Team:  No Known Provider as PCP - General  Subjective   SUBJECTIVE   UO remains poor,          Objective   OBJECTIVE   Vital Signs  Temp:  [97.2 °F (36.2 °C)-99.1 °F (37.3 °C)] 97.2 °F (36.2 °C)  Heart Rate:  [] 95  Resp:  [24-37] 24  BP: ()/(44-85) 114/64  FiO2 (%):  [100 %] 100 %    Flowsheet Rows         First Filed Value    Admission Height  62\" (157.5 cm) Documented at 2017 0611    Admission Weight  (!)  302 lb (137 kg) Documented at 2017 0611           I/O last 3 completed shifts:  In: 4217 [I.V.:4097; Other:120]  Out: 1035 [Urine:25; Other:1010]    PHYSICAL EXAM    Physical Exam   Constitutional: She appears well-developed.   HENT:   Head: Normocephalic.   Eyes: Pupils are equal, round, and reactive to light.   Cardiovascular: Normal rate, regular rhythm and normal heart sounds.    Pulmonary/Chest: Effort normal and breath sounds normal.   Abdominal: Soft. Bowel sounds are normal.   Musculoskeletal: She exhibits edema.       RESULTS   Results Review:      Results from last 7 days  Lab Units 17  0715 17  0258 17  2158  17  0410  17  0356   SODIUM mmol/L  --  135*  --   --  137  --  135*   SODIUM, ARTERIAL mmol/L 132.4*  --  131.3*  < >  --   < >  --    POTASSIUM mmol/L  --  3.6  --   --  5.2*  --  4.9   CHLORIDE mmol/L  --  87*  --   --  98  --  98   CO2 mmol/L  --  33.0*  --   --  20.0*  --  19.0*   BUN mg/dL  --  37*  --   --  55*  --  41*   CREATININE mg/dL  --  4.68*  --   --  5.46*  --  3.36*   CALCIUM mg/dL  --  7.5*  --   --  7.6*  --  8.2*   BILIRUBIN mg/dL  --  6.1*  --   --  5.2*  --  3.6*   ALK PHOS U/L  --  470*  --   --  307*  --  189*   ALT " (SGPT) U/L  --  123*  --   --  110*  --  97*   AST (SGOT) U/L  --  387*  --   --  268*  --  213*   GLUCOSE mg/dL  --  143*  --   --  124*  --  185*   GLUCOSE, ARTERIAL mmol/L 132  --  145  < >  --   < >  --    < > = values in this interval not displayed.    Estimated Creatinine Clearance: 23.2 mL/min (by C-G formula based on Cr of 4.68).                  Results from last 7 days  Lab Units 07/09/17  0258 07/08/17  0410 07/07/17  0356 07/06/17  0701   WBC 10*3/mm3 16.28* 10.81* 17.96* 4.25   HEMOGLOBIN g/dL 12.1 12.5 15.4 13.4   PLATELETS 10*3/mm3 158 147* 237 164               Imaging Results (last 24 hours)     Procedure Component Value Units Date/Time    IR insert non-tunneled CVC 5+ [[id number]] Resulted:  07/08/17 1720     Updated:  07/08/17 1720    Narrative:       OPERATIVE NOTE  Lili Méndez  1975  7/8/2017     PREOP DIAGNOSES:  Acute kidney injury requiring short term hemodialysis.     POSTOP DIAGNOSES:   Acute kidney injury requiring short term hemodialysis.     PROCEDURE:   Placement non-tunnelled central venous catheter (13Fr trialysis)  Vascular ultrasound guidance     SURGEON: SEVERO Mendiola MD [facility]      ANESTHESIA: local 1% lidocaine     COMPLICATIONS: none     DESCRIPTION OF OPERATION: In CCU, patient placed in supine position,   prepped and draped in sterile fashion. Vascular ultrasound was used to   identify the right common femoral vein which was 10mm in diameter and   patent. Cannulated via modified Seldinger technique with mini stick under   ultrasound guidance. Exchanged for a 0.035 guidewire and serial dilators.   A 13Fr Trialysis catheter was placed, aspirated and flushed without   difficulty with Hep-Lock solution. 1000U/mL heparin instilled into lumens.   catheter secured to the skin with 2-0 nylon suture. Sterile dressing   applied. patient tolerated procedure well. dialysis notified.              This document has been electronically signed by Damian Mendiola MD   on  July 8, 2017 9:57 AM     XR Chest 1 View [[id number]] Collected:  07/08/17 2221     Updated:  07/08/17 2236    Narrative:         Chest single view on  7/8/2017     CLINICAL INDICATION: Low oxygen saturation    COMPARISON: 7/8/2017    FINDINGS: ET tube tip is in the midthoracic trachea. NG tube  extends likely into the upper stomach. Right-sided PICC line tip  is in the SVC. New right IJ catheter tip is at the cavoatrial  junction. There is no pneumothorax. A few overlying wires are  noted. Heart is borderline in size. There remains extensive  bilateral opacities consistent with likely extensive bilateral  pneumonia. Small pleural effusions may be present.      Impression:       New right IJ catheter in place as above with  otherwise no significant change.    Electronically signed by:  Jorge Leary  7/8/2017 10:35 PM  CDT Workstation: [id number]           MEDICATIONS      albuterol 4 puff Inhalation Q4H - RT   artificial tears  Both Eyes Q4H   chlorhexidine 15 mL Mouth/Throat Q12H   famotidine 20 mg Intravenous BID   heparin (porcine) 5,000 Units Subcutaneous Q12H   insulin aspart 0-9 Units Subcutaneous 4x Daily AC & at Bedtime   ipratropium 4 puff Inhalation Q4H - RT   levoFLOXacin 250 mg Intravenous Q48H   levothyroxine 300 mcg Oral Daily   methylPREDNISolone sodium succinate 60 mg Intravenous Q8H   nystatin  Topical Q12H       insulin regular infusion 1 unit/mL (CCU use) 10 Units/hr Last Rate: 6 Units/hr (07/09/17 0703)   midazolam 1-10 mg/hr Last Rate: Stopped (07/07/17 0049)   norepinephrine 0.02-0.3 mcg/kg/min    norepinephrine 0.02-0.3 mcg/kg/min Last Rate: Stopped (07/08/17 3435)   propofol 5-80 mcg/kg/min Last Rate: 40 mcg/kg/min (07/07/17 0716)   sodium bicarbonate drip (greater than 75 mEq/bag) 125 mL/hr Last Rate: 125 mL/hr (07/08/17 1701)   vecuronium (NORCURON) infusion 0.6 mcg/kg/min        Assessment/Plan   ASSESSMENT / PLAN    Principal Problem:    Acute respiratory failure with hypoxia and  hypercapnia  Active Problems:    Morbid obesity    Obstructive apnea    Diabetes mellitus type 2, uncontrolled    Abnormal LFTs    Bilateral pneumonia    ARDS (adult respiratory distress syndrome)    Pulmonary hypertension    Legionella infection    UTI (urinary tract infection)    Sepsis    Acute respiratory acidosis    Metabolic acidosis    Anuria    Acute renal failure    1.acute kidney injury. She likely has ATN in the setting of possible sepsis hypotension. S/p hd yesterday. Next hd tomorrow. Appreciate dr Mendiola help for temp access. Stop bicarbonate drip. Start normal saline low rate     2.pneumonia with respiratory failure. Patient also has morbid obesity and possible hyperventilation syndrome. She has Legionella antigen positive in the urine. Patient is currently intubated. Her blood and sputum cultures are negative so far. Ct levaquin     3.diabetes mellitus type 2     4.hypertension. Now low. Not requiring any pressors at present                This document has been electronically signed by Olya Singh MD on July 9, 2017 10:07 AM

## 2017-07-09 NOTE — PLAN OF CARE
Problem: Patient Care Overview (Adult)  Goal: Plan of Care Review  Outcome: Ongoing (interventions implemented as appropriate)    07/09/17 0523   Coping/Psychosocial Response Interventions   Plan Of Care Reviewed With patient;durable power of ;sibling   Patient Care Overview   Progress declining   Outcome Evaluation   Outcome Summary/Follow up Plan Pt remains on ventilator, O2 sat has dropped to 77%. MD aware. BP and HR stable. Still minimal output. Family at bedside.        Goal: Adult Individualization and Mutuality  Outcome: Ongoing (interventions implemented as appropriate)  Goal: Discharge Needs Assessment  Outcome: Ongoing (interventions implemented as appropriate)    Problem: Respiratory Insufficiency (Adult)  Goal: Identify Related Risk Factors and Signs and Symptoms  Outcome: Ongoing (interventions implemented as appropriate)  Goal: Acid/Base Balance  Outcome: Ongoing (interventions implemented as appropriate)  Goal: Effective Ventilation  Outcome: Ongoing (interventions implemented as appropriate)    Problem: Diabetes, Type 2 (Adult)  Goal: Signs and Symptoms of Listed Potential Problems Will be Absent or Manageable (Diabetes, Type 2)  Outcome: Ongoing (interventions implemented as appropriate)    Problem: Fall Risk (Adult)  Goal: Identify Related Risk Factors and Signs and Symptoms  Outcome: Ongoing (interventions implemented as appropriate)  Goal: Absence of Falls  Outcome: Ongoing (interventions implemented as appropriate)

## 2017-07-10 NOTE — PROGRESS NOTES
"Intensive Care Follow-up      LOS: 4 days     Ms. Liil Méndez, 42 y.o. female is followed for: Acute respiratory failure with hypoxia and hypercapnia   CHIEF COMPLIANTShortness of breath    Subjective - Interval History     22-year-old lady with oral diffuse pneumonia ARDS and renal failure acidosis on a mechanical ventilator.  Her respiratory status has not improved    The patient's relevant past medical, surgical and social history were reviewed and updated in Epic as appropriate.     Objective   /51  Pulse 97  Temp 100.3 °F (37.9 °C) (Oral)   Resp (!) 30  Ht 62\" (157.5 cm)  Wt (!) 345 lb 7.4 oz (157 kg)  SpO2 (!) 67%  BMI 63.19 kg/m2      Vent Settings: Vent Mode: VC+/AC  FiO2 (%):  [100 %] 100 %  S RR:  [22-30] 30  PEEP/CPAP (cm H2O):  [18 cm H20] 18 cm H20  MA SUP:  [0 cm H20] 0 cm H20  MAP (cm H2O):  [21-29] 26    Physical Exam:    General Appearance:    Sedated lady on a mechanical ventilator receiving dialysis    Head:    Normocephalic, without obvious abnormality, atraumatic   Eyes:            Lids and lashes normal, conjunctivae and sclerae normal, no   icterus, no pallor, corneas clear, PERRLA   Ears:    Ears appear intact with no abnormalities noted   Throat:   No oral lesions, no thrush, oral mucosa moist   Neck:   No adenopathy, supple, trachea midline, no thyromegaly, no   carotid bruit, no JVD   Back:     No kyphosis present, no scoliosis present, no skin lesions,      erythema or scars, no tenderness to percussion or                   palpation,   range of motion normal   Lungs:   Diminished breath sounds bilaterally     Heart:    Regular rhythm and normal rate, normal S1 and S2, no            murmur, no gallop, no rub, no click   Chest Wall:    No abnormalities observed   Abdomen:     Normal bowel sounds, no masses, no organomegaly, soft        non-tender, non-distended, no guarding, no rebound                tenderness   Rectal:     Deferred   Extremities:   Moves all extremities " well, no edema, no cyanosis, no             redness   Pulses:   Pulses palpable and equal bilaterally   Skin:   No bleeding, bruising or rash   Lymph nodes:   No palpable adenopathy   Neurologic:   Cranial nerves 2 - 12 grossly intact, sensation intact, DTR       present and equal bilaterally     LAB:    pH, Arterial   Date Value Ref Range Status   07/10/2017 7.038 (L) 7.350 - 7.450 pH units Final     pCO2, Arterial   Date Value Ref Range Status   07/10/2017 105.2 (H) 35.0 - 45.0 mm Hg Final     pO2, Arterial   Date Value Ref Range Status   07/10/2017 45.2 (L) 80.0 - 105.0 mm Hg Final     Lab Results   Component Value Date    WBC 16.28 (H) 07/09/2017    HGB 12.1 07/09/2017    HCT 35.9 07/09/2017    MCV 88.6 07/09/2017     07/09/2017     Lab Results   Component Value Date    GLUCOSE 410 07/10/2017    BUN 37 (H) 07/09/2017    CREATININE 4.68 (H) 07/09/2017    EGFRIFNONA 10 (L) 07/09/2017    BCR 7.9 07/09/2017    CO2 33.0 (H) 07/09/2017    CALCIUM 7.5 (L) 07/09/2017    ALBUMIN 3.10 (L) 07/09/2017    LABIL2 0.7 (L) 07/09/2017     (H) 07/09/2017     (H) 07/09/2017         RAD:   Imaging Results (last 24 hours)     Procedure Component Value Units Date/Time    XR Chest 1 View [[id number]] Collected:  07/10/17 0420     Updated:  07/10/17 0441    Narrative:         Chest single view on  7/10/2017     CLINICAL INDICATION: Pneumonia, respiratory failure    COMPARISON: 7/8/2017    FINDINGS: ET tube tip is in the mid to upper thoracic trachea. NG  tube extends into the stomach. A few overlying wires are noted.  Right-sided PICC line tip is in the upper SVC. Right IJ catheter  tip is near the cavoatrial junction. Mild cardiomegaly is noted.  There has been no significant change in extensive bilateral  opacities greater on the right consistent with likely bilateral  pneumonia. Small pleural effusions may be present.      Impression:       No significant change in the appearance of the chest.    Electronically  signed by:  Jorge Leary  7/10/2017 4:40 AM  CDT Workstation: [id number]         [facility] Problem List     * (Principal)Acute respiratory failure with hypoxia and hypercapnia    Morbid obesity (Chronic)    Obstructive apnea (Chronic)    Diabetes mellitus type 2, uncontrolled (Chronic)    Abnormal LFTs    Bilateral pneumonia    ARDS (adult respiratory distress syndrome)    Pulmonary hypertension (Chronic)    Legionella infection    UTI (urinary tract infection)    Sepsis    Acute respiratory acidosis    Metabolic acidosis (Chronic)    Anuria    Acute renal failure               Plan        Assessment is bilateral pneumonia/ARDS/acute renal failure.  Severe oxygenation failure    Recommendations continue present measures follow chest x-ray and blood gas    I discussed the patient's findings and my recommendations with patient and nursing staff

## 2017-07-10 NOTE — PROGRESS NOTES
"Pharmacokinetics by Pharmacy - Vancomycin    Lili Méndez is a 42 y.o. female   [Ht: 62\" (157.5 cm); Wt: (!) 345 lb 7.4 oz (157 kg)]    Estimated Creatinine Clearance: 16.1 mL/min (by C-G formula based on Cr of 6.74).   Lab Results   Component Value Date    CREATININE 6.74 (H) 07/10/2017    CREATININE 4.68 (H) 07/09/2017    CREATININE 5.46 (H) 07/08/2017      Lab Results   Component Value Date    WBC 38.13 (H) 07/10/2017    WBC 16.28 (H) 07/09/2017    WBC 10.81 (H) 07/08/2017      Temp Readings from Last 1 Encounters:   07/10/17 (!) 101.3 °F (38.5 °C) (Oral)       Indication for use: Pneumonia     Baseline culture results:  Microbiology Results (last 10 days)       Procedure Component Value - Date/Time      Respiratory Culture [[id number]] Collected:  07/06/17 2243    Lab Status:  Final result Specimen:  Sputum from ET Suction Updated:  07/09/17 0802     Respiratory Culture Reduced Normal Respiratory Madonna     Gram Stain Result Rare (1+) WBCs seen      No Epithelial cells seen      Mixed bacterial madonna    Respiratory Culture [[id number]] Collected:  07/06/17 1734    Lab Status:  Final result Specimen:  Sputum from ET Suction Updated:  07/06/17 2059     Respiratory Culture Rejected     Gram Stain Result Few (2+) Epithelial cells per low power field      Rare (1+) WBCs per low power field      Specimen rejected based upon microscopic exam of gram stain    Narrative:         Specimen rejected due to microscopic exam of gram stain.    S. Pneumo Ag Urine or CSF [[id number]]  (Normal) Collected:  07/06/17 1619    Lab Status:  Final result Specimen:  Urine from Urine, Catheter Updated:  07/06/17 1736     Strep Pneumo Ag Negative    Urine Culture [[id number]]  (Normal) Collected:  07/06/17 1619    Lab Status:  Final result Specimen:  Urine from Urine, Catheter Updated:  07/08/17 0623     Urine Culture No growth at 24 hours    Blood Culture [[id number]]  (Normal) Collected:  07/06/17 0906    Lab Status:  Preliminary result " Specimen:  Blood from Hand, Right Updated:  07/09/17 1001     Blood Culture No growth at 3 days    Blood Culture [[id number]]  (Normal) Collected:  07/06/17 0701    Lab Status:  Preliminary result Specimen:  Blood from Arm, Left Updated:  07/10/17 0801     Blood Culture No growth at 4 days          Respiratory Culture   Date Value Ref Range Status   07/06/2017 Reduced Normal Respiratory Sendy  Final   07/06/2017 Rejected  Final         Assessment/Plan  Initiated Vancomycin 2000 mg IVPB x 1 dose after dialysis today, and collect a random level on Wed morning prior to dialysis. Chart and labs reviewed. Receiving vancomycin and levaquin for pneumonia.  Pharmacy will monitor renal function and adjust dose accordingly.    Melia Haney RPH  07/10/17 9:59 AM

## 2017-07-10 NOTE — DISCHARGE SUMMARY
[facility] Medicine Services  DEATH SUMMARY       Date of Admission: 7/6/2017  Date of Death:  7/10/2017 at approximately 1230  Primary Care Physician: No Known Provider    Presenting Problem/History of Present Illness:  Hyperglycemia [R73.9]  Acute respiratory failure with hypoxia and hypercapnia [J96.01, J96.02]  Sepsis due to pneumonia [J18.9, A41.9]  Acute renal failure, unspecified acute renal failure type [N17.9]     Final Death Diagnoses:  Hospital Problem List     * (Principal)Acute respiratory failure with hypoxia and hypercapnia    Morbid obesity (Chronic)    Obstructive apnea (Chronic)    Diabetes mellitus type 2, uncontrolled (Chronic)    Abnormal LFTs    Bilateral pneumonia    ARDS (adult respiratory distress syndrome)    Pulmonary hypertension (Chronic)    Legionella infection    UTI (urinary tract infection)    Sepsis    Acute respiratory acidosis    Metabolic acidosis (Chronic)    Anuria    Acute renal failure          Consults:   Consults     Date and Time Order Name Status Description    7/8/2017 0904 Inpatient Consult to Cardiothoracic Surgery      7/7/2017 1631 Inpatient Consult to Nephrology Completed     7/6/2017 1544 Inpatient Consult to Pulmonology Completed     7/6/2017 0735 Hospitalist (on-call MD unless specified)            Procedures Performed:                 Pertinent Test Results: None    Hospital Course:  The patient is a 42 y.o. female who presented to [facility] with respiratory distress. She was found to have acute hypoxic and hypercapnic respiratory failure with ARDS. She required intubation with mechanical ventilation. Patient was placed on AC per ARDSnet protocol with Pulmonary consult for vent management. She required high vent settings with high PEEP, with poor oxygen saturation. In addition, she had hypotension that required vasopressors to maintain mean arterial pressure. In addition, patient became anuric during  hospital course, Nephrology was consulted and patient underwent hemodialysis treatment. Unfortunately, despite maximal medical therapies and optimal vent settings, patient's oxygen saturation continued to drop, in addition to her overall clinical condition. Discussed condition with patient's POA. Decision was made to make patient comfort measures. At approximately 1230 on 7/10/17, patient was found to be . Family was notified at time of death.       Kendall Terrazas MD  07/10/17  3:00 PM    Time: Time of death was 1230

## 2017-07-10 NOTE — CONSULTS
Adult Nutrition  Assessment    Patient Name:  Lili Méndez  YOB: 1975  MRN: [id number]  Admit Date:  7/6/2017    Assessment Date:  7/10/2017                            Comments:  RD consulted this am re: tube feeding assessment, but family has decided to proceed w/comfort care only.         Electronically signed by:  Ying Simpson RD  07/10/17 12:18 PM

## 2017-07-10 NOTE — PROGRESS NOTES
"[facility]  [address] [place] [address]. [address]  T - [phone]  F - [phone]     Progress Note          PATIENT  DEMOGRAPHICS   PATIENT NAME: Lili Méndez                      PHYSICIAN: Olya Singh MD  : 1975  MRN: [id number]   LOS: 4 days    Patient Care Team:  No Known Provider as PCP - General  Subjective   SUBJECTIVE   Receiving hd minimal UF on levophed         Objective   OBJECTIVE   Vital Signs  Temp:  [99.3 °F (37.4 °C)-101.3 °F (38.5 °C)] 101.3 °F (38.5 °C)  Heart Rate:  [] 96  Resp:  [30-32] 31  BP: ()/(50-63) 99/55  FiO2 (%):  [100 %] 100 %    Flowsheet Rows         First Filed Value    Admission Height  62\" (157.5 cm) Documented at 2017 0611    Admission Weight  (!)  302 lb (137 kg) Documented at 2017 0611           I/O last 3 completed shifts:  In: 2491 [I.V.:2431; Other:60]  Out: 5 [Urine:5]    PHYSICAL EXAM    Physical Exam   Constitutional: She appears well-developed.   HENT:   Head: Normocephalic.   Eyes: Pupils are equal, round, and reactive to light.   Cardiovascular: Normal rate, regular rhythm and normal heart sounds.    Pulmonary/Chest: Effort normal and breath sounds normal.   Abdominal: Soft. Bowel sounds are normal.   Musculoskeletal: She exhibits edema.       RESULTS   Results Review:      Results from last 7 days  Lab Units 07/10/17  0711 07/10/17  0447 17  0715 17  0258  17  0410  17  0356   SODIUM mmol/L 134*  --   --  135*  --  137  --  135*   SODIUM, ARTERIAL mmol/L  --  131.8* 132.4*  --   < >  --   < >  --    POTASSIUM mmol/L 4.9  --   --  3.6  --  5.2*  --  4.9   CHLORIDE mmol/L 86*  --   --  87*  --  98  --  98   CO2 mmol/L 25.0  --   --  33.0*  --  20.0*  --  19.0*   BUN mg/dL 56*  --   --  37*  --  55*  --  41*   CREATININE mg/dL 6.74*  --   --  4.68*  --  5.46*  --  3.36*   CALCIUM mg/dL 6.8*  --   --  7.5*  --  7.6*  --  8.2*   BILIRUBIN mg/dL  --   --   --  6.1*  --  5.2*  --  " 3.6*   ALK PHOS U/L  --   --   --  470*  --  307*  --  189*   ALT (SGPT) U/L  --   --   --  123*  --  110*  --  97*   AST (SGOT) U/L  --   --   --  387*  --  268*  --  213*   GLUCOSE mg/dL 381*  --   --  143*  --  124*  --  185*   GLUCOSE, ARTERIAL mmol/L  --  410 132  --   < >  --   < >  --    < > = values in this interval not displayed.    Estimated Creatinine Clearance: 16.1 mL/min (by C-G formula based on Cr of 6.74).                  Results from last 7 days  Lab Units 07/10/17  0711 07/09/17  0258 07/08/17  0410 07/07/17  0356 07/06/17  0701   WBC 10*3/mm3 38.13* 16.28* 10.81* 17.96* 4.25   HEMOGLOBIN g/dL 11.4* 12.1 12.5 15.4 13.4   PLATELETS 10*3/mm3 248 158 147* 237 164               Imaging Results (last 24 hours)     Procedure Component Value Units Date/Time    XR Chest 1 View [[id number]] Collected:  07/10/17 0420     Updated:  07/10/17 0441    Narrative:         Chest single view on  7/10/2017     CLINICAL INDICATION: Pneumonia, respiratory failure    COMPARISON: 7/8/2017    FINDINGS: ET tube tip is in the mid to upper thoracic trachea. NG  tube extends into the stomach. A few overlying wires are noted.  Right-sided PICC line tip is in the upper SVC. Right IJ catheter  tip is near the cavoatrial junction. Mild cardiomegaly is noted.  There has been no significant change in extensive bilateral  opacities greater on the right consistent with likely bilateral  pneumonia. Small pleural effusions may be present.      Impression:       No significant change in the appearance of the chest.    Electronically signed by:  Jorge Leary  7/10/2017 4:40 AM  CDT Workstation: [id number]           MEDICATIONS      albuterol 4 puff Inhalation Q4H - RT   artificial tears  Both Eyes Q4H   chlorhexidine 15 mL Mouth/Throat Q12H   famotidine 20 mg Intravenous BID   heparin (porcine) 5,000 Units Subcutaneous Q12H   insulin aspart 0-7 Units Subcutaneous 4x Daily AC & at Bedtime   ipratropium 4 puff Inhalation Q4H - RT     levoFLOXacin 250 mg Intravenous Q48H   levothyroxine 300 mcg Oral Daily   methylPREDNISolone sodium succinate 60 mg Intravenous Q8H   nystatin  Topical Q12H       midazolam 1-10 mg/hr Last Rate: Stopped (07/07/17 0049)   norepinephrine 0.02-0.3 mcg/kg/min    norepinephrine 0.02-0.3 mcg/kg/min Last Rate: 0.3 mcg/kg/min (07/10/17 0845)   propofol 5-80 mcg/kg/min Last Rate: 40 mcg/kg/min (07/07/17 0456)   sodium chloride 60 mL/hr Last Rate: 60 mL/hr (07/10/17 0755)   vecuronium (NORCURON) infusion 0.6 mcg/kg/min        Assessment/Plan   ASSESSMENT / PLAN    Principal Problem:    Acute respiratory failure with hypoxia and hypercapnia  Active Problems:    Morbid obesity    Obstructive apnea    Diabetes mellitus type 2, uncontrolled    Abnormal LFTs    Bilateral pneumonia    ARDS (adult respiratory distress syndrome)    Pulmonary hypertension    Legionella infection    UTI (urinary tract infection)    Sepsis    Acute respiratory acidosis    Metabolic acidosis    Anuria    Acute renal failure    1.acute kidney injury. She likely has ATN in the setting of possible sepsis hypotension. Remained oliguric. Bicarb is better. Minimal UF for now. Can stop ivf and start TF with nepro.      2.pneumonia with respiratory failure. Patient also has morbid obesity and possible hyperventilation syndrome. She has Legionella antigen positive in the urine. Patient is currently intubated. Her blood and sputum cultures are negative so far. Ct levaquin and add vancomycin due to high wbc and hypotension     3.diabetes mellitus type 2                This document has been electronically signed by Olya Singh MD on July 10, 2017 9:02 AM

## 2017-07-11 LAB
BACTERIA SPEC AEROBE CULT: NORMAL
BACTERIA SPEC AEROBE CULT: NORMAL
GLUCOSE BLDC GLUCOMTR-MCNC: 195 MG/DL (ref 70–130)

## 2017-07-12 LAB — HBV SURFACE AG SERPL QL IA: NEGATIVE
